# Patient Record
Sex: MALE | Race: WHITE | Employment: PART TIME | URBAN - METROPOLITAN AREA
[De-identification: names, ages, dates, MRNs, and addresses within clinical notes are randomized per-mention and may not be internally consistent; named-entity substitution may affect disease eponyms.]

---

## 2017-05-13 ENCOUNTER — HOSPITAL ENCOUNTER (OUTPATIENT)
Age: 47
Discharge: HOME | End: 2017-05-13
Attending: ORTHOPAEDIC SURGERY
Payer: COMMERCIAL

## 2017-05-13 DIAGNOSIS — M13.162 MONOARTHRITIS OF KNEE, LEFT: ICD-10-CM

## 2017-05-13 PROCEDURE — 73721 MRI JNT OF LWR EXTRE W/O DYE: CPT

## 2018-01-10 ENCOUNTER — OFFICE VISIT (OUTPATIENT)
Dept: SURGERY | Age: 48
End: 2018-01-10

## 2018-01-10 VITALS
RESPIRATION RATE: 20 BRPM | SYSTOLIC BLOOD PRESSURE: 112 MMHG | DIASTOLIC BLOOD PRESSURE: 70 MMHG | HEIGHT: 72 IN | WEIGHT: 315 LBS | BODY MASS INDEX: 42.66 KG/M2 | TEMPERATURE: 98.6 F | HEART RATE: 56 BPM

## 2018-01-10 DIAGNOSIS — G47.33 OSA ON CPAP: ICD-10-CM

## 2018-01-10 DIAGNOSIS — I10 ESSENTIAL HYPERTENSION: ICD-10-CM

## 2018-01-10 DIAGNOSIS — E66.01 MORBID OBESITY WITH BMI OF 45.0-49.9, ADULT (HCC): Primary | ICD-10-CM

## 2018-01-10 DIAGNOSIS — Z99.89 OSA ON CPAP: ICD-10-CM

## 2018-01-10 DIAGNOSIS — E78.2 MIXED HYPERLIPIDEMIA: ICD-10-CM

## 2018-01-10 RX ORDER — CHOLECALCIFEROL (VITAMIN D3) 125 MCG
2 CAPSULE ORAL DAILY
COMMUNITY
End: 2019-04-29

## 2018-01-10 RX ORDER — LISINOPRIL 10 MG/1
TABLET ORAL
Refills: 0 | COMMUNITY
Start: 2017-10-16 | End: 2020-10-23 | Stop reason: SDUPTHER

## 2018-01-10 RX ORDER — SIMVASTATIN 20 MG/1
TABLET, FILM COATED ORAL
Refills: 0 | COMMUNITY
Start: 2017-11-21 | End: 2022-04-28

## 2018-01-10 NOTE — LETTER
1/10/2018 9:47 AM 
 
Patient:  Emmett La III YOB: 1970 Date of Visit: 1/10/2018 Rocio Bowers MD 
[address] [facility] [address] [facility] [address] VIA Facsimile: [phone] Dear Rocio Bowers MD, Thank you for referring Mr. Radha Stevens to [place] [address] for evaluation and treatment. Below are the relevant portions of my assessment and plan of care. Initial Consultation for Bariatric Surgery Template (Gastric Bypass) Emmett La III is a 52 y.o. male who comes into the office today for initial consultation for the surgical options for the treatment of morbid obesity. The patient initially identified obesity at the age of 34 and at age 25 weighed 180lbs. He has tried a variety of unsupervised weight-loss attempts including self imposed, but has yet to meet with lasting success. Maximum weight lost on a diet is about 5 lbs, but that the weight loss always seems to return. Today, the patient is  Height: 6' (182.9 cm) tall, Weight: 153.8 kg (339 lb) lbs for a Body mass index is 45.98 kg/(m^2). It is due to the patient's severe obesity, which is further complicated by hypertension, hyperlipidemia, obstructive sleep apnea - CPAP and weight related arthopathies  that the patient is now seeking out bariatric surgery, specifically, the gastric bypass. Past Medical History:  
Diagnosis Date  Hypercholesterolemia  Hypertension  Sleep apnea   
 cpap Past Surgical History:  
Procedure Laterality Date  HX KNEE ARTHROSCOPY Left   
 torn meniscus  HX OTHER SURGICAL    
 wisdom teeth and implanted tooth Current Outpatient Prescriptions Medication Sig Dispense Refill  lisinopril (PRINIVIL, ZESTRIL) 10 mg tablet take 1 tablet by mouth once daily  0  
 simvastatin (ZOCOR) 20 mg tablet   0  
 naproxen sodium (ALEVE) 220 mg cap Take 2 Tabs by mouth daily. No Known Allergies Social History Substance Use Topics  Smoking status: Former Smoker Quit date: 1/10/2010  Smokeless tobacco: Never Used  Alcohol use Yes Family History Problem Relation Age of Onset  Kidney Disease Mother  Heart Disease Mother  Diabetes Mother  No Known Problems Father Family Status Relation Status  Mother Alive  Father Alive Review of Systems:  Positive in BOLD 
 
CONST: Fever, weight loss, fatigue or chills GI: Nausea, vomiting, abdominal pain, change in bowel habits, hematochezia, melena, and GERD INTEG: Dermatitis, abnormal moles HEENT: Recent changes in vision, vertigo, epistaxis, dysphagia and hoarseness CV: Chest pain, palpitations, HTN, edema and varicosities RESP: Cough, shortness of breath, wheezing, hemoptysis, snoring and reactive airway disease : Hematuria, dysuria, frequency, urgency, nocturia and stress urinary incontinence MS: Weakness, joint pain and arthritis ENDO: Diabetes, thyroid disease, polyuria, polydipsia, polyphagia, poor wound healing, heat intolerance, cold intolerance LYMPH/HEME: Anemia, bruising and history of blood transfusions NEURO: Dizziness, headache, fainting, seizures and stroke PSYCH: Anxiety and depression Physical Exam 
 
Visit Vitals  /70  Pulse (!) 56  Temp 98.6 °F (37 °C)  Resp 20  
 Ht 6' (1.829 m)  Wt 153.8 kg (339 lb)  BMI 45.98 kg/m2 Pre op weight: 339 EBW: 175 Wt loss to date: 0 General: 52 y.o.) male in no acute distress. Morbidly obese in abdomen - android pattern HEENT: Normocephalic, atraumatic, Pupils equal and reactive, nasopharynx clear, oropharynx clear and moist without lesions NECK: Supple, no lymphadenopathy, thyromegaly, carotid bruits or jugular venous distension. trachea midline RESP: Clear to auscultation bilaterally, no wheezes, rhonchi, or rales, normal respiratory excursion CV: Regular rate and rhythm, no murmurs, rubs or gallops.  3+/4 pulses in bilateral dorsalis pedis and posterior tibialis. No distal edema or varicosities. ABD: Soft, nontender, nondistended, normoactive bowel sounds, no hernias, no hepatosplenomegaly, easily palpable costal margins, android distribution Extremities: Warm, well perfused, no tenderness or swelling, normal gait/station Neuro: Sensation and strength grossly intact and symmetrical 
Psych: Alert and oriented to person, place, and time. Impression: 
 
Bibiana Kyle is a 52 y.o. male who is suffering from morbid obesity with a BMI of 46  and comorbidities including hypertension, hyperlipidemia, obstructive sleep apnea - CPAP and weight related arthopathies  who would benefit from bariatric surgery. We have had an extensive discussion with regard to the risks, benefits and likely outcomes of the operation. We've discussed the restrictive and malabsorptive nature of the gastric bypass and compared and contrasted with the sleeve gastrectomy. The patient understands the likelihood of losing approximately 80% of their excess weight in 12 to 18 months. He also understands the risks including but not limited to bleeding, infection, need for reoperation, ulcers, leaks and strictures, bowel obstruction secondary to adhesions and internal hernias, DVT, PE, heart attack, stroke, and death. Patient also understands risks of inadequate weight loss, excess weight loss, vitamin insufficiency, protein malnutrition, excess skin, and loss of hair. We have reviewed the components of a successful postoperative course including requirement for a high protein, low carbohydrate diet, 60 oz a day of zero calorie liquids, daily vitamin supplementation, daily exercise, regular follow-up, and participation in support groups.  At this time we will enroll the patient in our bariatric program, undertake routine laboratory evaluation, chest X-ray, EKG, possible UGI and evaluation by  nutritionist as well as psychologist and pending their satisfactory completion of the preop evaluation, plan to perform a gastric bypass. He is a [facility] patient. We will perform testing at his residential point. Thank you very much for your referral of Mr. Miguel Baugh. If you have questions, please do not hesitate to call me. I look forward to following Mr. Maren Hoang along with you and will keep you updated as to his progress.   
 
 
 
 
Sincerely, 
 
 
Waqas Jacobson MD

## 2018-01-10 NOTE — COMMUNICATION BODY
Initial Consultation for Bariatric Surgery Template (Gastric Bypass)    Zander Jones III is a 52 y.o. male who comes into the office today for initial consultation for the surgical options for the treatment of morbid obesity. The patient initially identified obesity at the age of 34 and at age 25 weighed 180lbs. He has tried a variety of unsupervised weight-loss attempts including self imposed, but has yet to meet with lasting success. Maximum weight lost on a diet is about 5 lbs, but that the weight loss always seems to return. Today, the patient is  Height: 6' (182.9 cm) tall, Weight: 153.8 kg (339 lb) lbs for a Body mass index is 45.98 kg/(m^2). It is due to the patient's severe obesity, which is further complicated by hypertension, hyperlipidemia, obstructive sleep apnea - CPAP and weight related arthopathies  that the patient is now seeking out bariatric surgery, specifically, the gastric bypass. Past Medical History:   Diagnosis Date    Hypercholesterolemia     Hypertension     Sleep apnea     cpap       Past Surgical History:   Procedure Laterality Date    HX KNEE ARTHROSCOPY Left     torn meniscus    HX OTHER SURGICAL      wisdom teeth and implanted tooth       Current Outpatient Prescriptions   Medication Sig Dispense Refill    lisinopril (PRINIVIL, ZESTRIL) 10 mg tablet take 1 tablet by mouth once daily  0    simvastatin (ZOCOR) 20 mg tablet   0    naproxen sodium (ALEVE) 220 mg cap Take 2 Tabs by mouth daily.          No Known Allergies    Social History   Substance Use Topics    Smoking status: Former Smoker     Quit date: 1/10/2010    Smokeless tobacco: Never Used    Alcohol use Yes       Family History   Problem Relation Age of Onset    Kidney Disease Mother     Heart Disease Mother     Diabetes Mother     No Known Problems Father        Family Status   Relation Status    Mother Alive    Father Alive       Review of Systems:  Positive in BOLD    CONST: Fever, weight loss, fatigue or chills  GI: Nausea, vomiting, abdominal pain, change in bowel habits, hematochezia, melena, and GERD   INTEG: Dermatitis, abnormal moles  HEENT: Recent changes in vision, vertigo, epistaxis, dysphagia and hoarseness  CV: Chest pain, palpitations, HTN, edema and varicosities  RESP: Cough, shortness of breath, wheezing, hemoptysis, snoring and reactive airway disease  : Hematuria, dysuria, frequency, urgency, nocturia and stress urinary incontinence   MS: Weakness, joint pain and arthritis  ENDO: Diabetes, thyroid disease, polyuria, polydipsia, polyphagia, poor wound healing, heat intolerance, cold intolerance  LYMPH/HEME: Anemia, bruising and history of blood transfusions  NEURO: Dizziness, headache, fainting, seizures and stroke  PSYCH: Anxiety and depression      Physical Exam    Visit Vitals    /70    Pulse (!) 56    Temp 98.6 °F (37 °C)    Resp 20    Ht 6' (1.829 m)    Wt 153.8 kg (339 lb)    BMI 45.98 kg/m2       Pre op weight: 339  EBW: 175  Wt loss to date: 0       General: 52 y.o.) male in no acute distress. Morbidly obese in abdomen - android pattern  HEENT: Normocephalic, atraumatic, Pupils equal and reactive, nasopharynx clear, oropharynx clear and moist without lesions  NECK: Supple, no lymphadenopathy, thyromegaly, carotid bruits or jugular venous distension. trachea midline  RESP: Clear to auscultation bilaterally, no wheezes, rhonchi, or rales, normal respiratory excursion  CV: Regular rate and rhythm, no murmurs, rubs or gallops. 3+/4 pulses in bilateral dorsalis pedis and posterior tibialis. No distal edema or varicosities.   ABD: Soft, nontender, nondistended, normoactive bowel sounds, no hernias, no hepatosplenomegaly, easily palpable costal margins, android distribution  Extremities: Warm, well perfused, no tenderness or swelling, normal gait/station  Neuro: Sensation and strength grossly intact and symmetrical  Psych: Alert and oriented to person, place, and time.    Impression:    Bibiana Kyle is a 52 y.o. male who is suffering from morbid obesity with a BMI of 46  and comorbidities including hypertension, hyperlipidemia, obstructive sleep apnea - CPAP and weight related arthopathies  who would benefit from bariatric surgery. We have had an extensive discussion with regard to the risks, benefits and likely outcomes of the operation. We've discussed the restrictive and malabsorptive nature of the gastric bypass and compared and contrasted with the sleeve gastrectomy. The patient understands the likelihood of losing approximately 80% of their excess weight in 12 to 18 months. He also understands the risks including but not limited to bleeding, infection, need for reoperation, ulcers, leaks and strictures, bowel obstruction secondary to adhesions and internal hernias, DVT, PE, heart attack, stroke, and death. Patient also understands risks of inadequate weight loss, excess weight loss, vitamin insufficiency, protein malnutrition, excess skin, and loss of hair. We have reviewed the components of a successful postoperative course including requirement for a high protein, low carbohydrate diet, 60 oz a day of zero calorie liquids, daily vitamin supplementation, daily exercise, regular follow-up, and participation in support groups. At this time we will enroll the patient in our bariatric program, undertake routine laboratory evaluation, chest X-ray, EKG, possible UGI and evaluation by  nutritionist as well as psychologist and pending their satisfactory completion of the preop evaluation, plan to perform a gastric bypass. He is a [facility] patient. We will perform testing at his detention point.

## 2018-01-10 NOTE — PROGRESS NOTES
Initial Consultation for Bariatric Surgery Template (Gastric Bypass)    Colin Foote III is a 52 y.o. male who comes into the office today for initial consultation for the surgical options for the treatment of morbid obesity. The patient initially identified obesity at the age of 34 and at age 25 weighed 180lbs. He has tried a variety of unsupervised weight-loss attempts including self imposed, but has yet to meet with lasting success. Maximum weight lost on a diet is about 5 lbs, but that the weight loss always seems to return. Today, the patient is  Height: 6' (182.9 cm) tall, Weight: 153.8 kg (339 lb) lbs for a Body mass index is 45.98 kg/(m^2). It is due to the patient's severe obesity, which is further complicated by hypertension, hyperlipidemia, obstructive sleep apnea - CPAP and weight related arthopathies  that the patient is now seeking out bariatric surgery, specifically, the gastric bypass. Past Medical History:   Diagnosis Date    Hypercholesterolemia     Hypertension     Sleep apnea     cpap       Past Surgical History:   Procedure Laterality Date    HX KNEE ARTHROSCOPY Left     torn meniscus    HX OTHER SURGICAL      wisdom teeth and implanted tooth       Current Outpatient Prescriptions   Medication Sig Dispense Refill    lisinopril (PRINIVIL, ZESTRIL) 10 mg tablet take 1 tablet by mouth once daily  0    simvastatin (ZOCOR) 20 mg tablet   0    naproxen sodium (ALEVE) 220 mg cap Take 2 Tabs by mouth daily.          No Known Allergies    Social History   Substance Use Topics    Smoking status: Former Smoker     Quit date: 1/10/2010    Smokeless tobacco: Never Used    Alcohol use Yes       Family History   Problem Relation Age of Onset    Kidney Disease Mother     Heart Disease Mother     Diabetes Mother     No Known Problems Father        Family Status   Relation Status    Mother Alive    Father Alive       Review of Systems:  Positive in BOLD    CONST: Fever, weight loss, fatigue or chills  GI: Nausea, vomiting, abdominal pain, change in bowel habits, hematochezia, melena, and GERD   INTEG: Dermatitis, abnormal moles  HEENT: Recent changes in vision, vertigo, epistaxis, dysphagia and hoarseness  CV: Chest pain, palpitations, HTN, edema and varicosities  RESP: Cough, shortness of breath, wheezing, hemoptysis, snoring and reactive airway disease  : Hematuria, dysuria, frequency, urgency, nocturia and stress urinary incontinence   MS: Weakness, joint pain and arthritis  ENDO: Diabetes, thyroid disease, polyuria, polydipsia, polyphagia, poor wound healing, heat intolerance, cold intolerance  LYMPH/HEME: Anemia, bruising and history of blood transfusions  NEURO: Dizziness, headache, fainting, seizures and stroke  PSYCH: Anxiety and depression      Physical Exam    Visit Vitals    /70    Pulse (!) 56    Temp 98.6 °F (37 °C)    Resp 20    Ht 6' (1.829 m)    Wt 153.8 kg (339 lb)    BMI 45.98 kg/m2       Pre op weight: 339  EBW: 175  Wt loss to date: 0       General: 52 y.o.) male in no acute distress. Morbidly obese in abdomen - android pattern  HEENT: Normocephalic, atraumatic, Pupils equal and reactive, nasopharynx clear, oropharynx clear and moist without lesions  NECK: Supple, no lymphadenopathy, thyromegaly, carotid bruits or jugular venous distension. trachea midline  RESP: Clear to auscultation bilaterally, no wheezes, rhonchi, or rales, normal respiratory excursion  CV: Regular rate and rhythm, no murmurs, rubs or gallops. 3+/4 pulses in bilateral dorsalis pedis and posterior tibialis. No distal edema or varicosities.   ABD: Soft, nontender, nondistended, normoactive bowel sounds, no hernias, no hepatosplenomegaly, easily palpable costal margins, android distribution  Extremities: Warm, well perfused, no tenderness or swelling, normal gait/station  Neuro: Sensation and strength grossly intact and symmetrical  Psych: Alert and oriented to person, place, and time.    Impression:    Dmitry Olvera is a 52 y.o. male who is suffering from morbid obesity with a BMI of 46  and comorbidities including hypertension, hyperlipidemia, obstructive sleep apnea - CPAP and weight related arthopathies  who would benefit from bariatric surgery. We have had an extensive discussion with regard to the risks, benefits and likely outcomes of the operation. We've discussed the restrictive and malabsorptive nature of the gastric bypass and compared and contrasted with the sleeve gastrectomy. The patient understands the likelihood of losing approximately 80% of their excess weight in 12 to 18 months. He also understands the risks including but not limited to bleeding, infection, need for reoperation, ulcers, leaks and strictures, bowel obstruction secondary to adhesions and internal hernias, DVT, PE, heart attack, stroke, and death. Patient also understands risks of inadequate weight loss, excess weight loss, vitamin insufficiency, protein malnutrition, excess skin, and loss of hair. We have reviewed the components of a successful postoperative course including requirement for a high protein, low carbohydrate diet, 60 oz a day of zero calorie liquids, daily vitamin supplementation, daily exercise, regular follow-up, and participation in support groups. At this time we will enroll the patient in our bariatric program, undertake routine laboratory evaluation, chest X-ray, EKG, possible UGI and evaluation by  nutritionist as well as psychologist and pending their satisfactory completion of the preop evaluation, plan to perform a gastric bypass. He is a [facility] patient. We will perform testing at his longterm point.

## 2018-01-10 NOTE — PROGRESS NOTES
Pt ID confirmed    Weight Loss Metrics 1/10/2018 1/10/2018   Pre op / Initial Wt 339 -   Today's Wt - 339 lb   BMI - 45.98 kg/m2   Ideal Body Wt 164 -   Excess Body Wt 175 -   Goal Wt 199 -   Wt loss to date 0 -   % Wt Loss 0 -   80%  -       Body mass index is 45.98 kg/(m^2).

## 2018-01-12 LAB
UREA BREATH TEST QL: NEGATIVE
UREA BREATH TEST QL: NORMAL

## 2018-02-06 ENCOUNTER — HOSPITAL ENCOUNTER (OUTPATIENT)
Dept: BARIATRICS/WEIGHT MGMT | Age: 48
Discharge: HOME | End: 2018-02-06

## 2018-02-07 ENCOUNTER — DOCUMENTATION ONLY (OUTPATIENT)
Dept: BARIATRICS/WEIGHT MGMT | Age: 48
End: 2018-02-07

## 2018-02-07 NOTE — PROGRESS NOTES
[address] [place] [facility] [address] [facility], [address]    Patient's Name: aMlena Dhaliwal   Age: 52 y.o. YOB: 1970   Sex: male    Date:   2/7/2018    Insurance:            Session: 1 of 12  Revision:   Surgeon:  Dr. Urszula Jean    Height: 6 f  Weight:    339      Lbs. BMI: 46.1   Pounds Lost since last month: 0               Pounds Gained since last month: 0    Starting Weight: 339   Previous Months Weight: 339  Overall Pounds Lost: 0 Overall Pounds Gained: 0      Do you smoke? None    Alcohol intake:  Number of drinks at a time:  4-5  Number of times a week: 2-3    Class Guidelines    Guidelines are reviewed with patient at the start of every class. 1. Patient understands that weight loss trial classes must be consecutive. Patient understands if they miss a class, it is their responsibility to contact me to reschedule class. I will reach out to patient after their first no show. 2.  Patient understands the expectations that weight maintenance/weight loss is expected during the classes. Failure to demonstrate changes may result in one extra month of weight loss trial, followed by going back to see the surgeon. 3. Patient is also instructed to be doing their labs, blood work, psych visit, support group and any other test that the surgeon has used while they are working on their weight loss trial.    Other Pertinent Information:     Changes Made Since Last Class:     Eating Habits and Behaviors    In today's class, we talked about the key diet principles. We talked a lot carbohydrates and the effects of carbohydrates on the body. Patient was encouraged to keep their daily carbohydrate intake less than 100 grams per day. Patient was encouraged to start cutting out the starchy foods, such as bread, rice, and pasta.   Patient is encouraged to get their carbohydrates in the form of dairy, vegetables, bean, some fruit, but starting to get rid of the starchy vegetables. We also talked about snacks that are more protein-based. Patient was encouraged to stay away from crackers, cookies, candy, etc.  We also talked about avoiding liquid calories and focusing more on water, Crystal Light, or other drinks that have no carbonation, no caffeine, no sugar. I also gave a power point of 13 ways to eat healthy on a tight budget. Some of these things included planning meals, sticking to a grocery list, and cooking at home. Patient was encouraged to buy whole foods, which is often cheaper than buying packaged foods. Also, stop buying junk food, which adds up in cost.    Patient's current diet habits include: Patient's diet is heavy in carbohydrates. He is eating toast for breakfast.  Lunch is often a sandwich. Dinner varies between meat, starch. He is snacking on chips and Nabs. He is eating out 1-2 x a month. He is drinking 80 ounces of fluid per day. Physical Activity/Exercise    Comments:     Currently for exercise, patient is not doing anything. We talked about activities for patient to do, including walking, swimming, or chair exercises. Behavior Modification       Comments: We also talked about behavior modifications. I have encouraged patient to food journal and write down what they are eating. Patient was again encouraged to keep their daily carbohydrates less than 100 grams per day. We talked about not eating in front of the TV and the importance of planning meals ahead of time.     Goals that patient wants to work on for next month include:      Chi Cobos. Roxana Delaney 112  2/7/2018

## 2018-02-22 ENCOUNTER — TELEPHONE (OUTPATIENT)
Dept: SURGERY | Age: 48
End: 2018-02-22

## 2018-03-06 ENCOUNTER — TELEPHONE (OUTPATIENT)
Dept: SURGERY | Age: 48
End: 2018-03-06

## 2018-03-06 ENCOUNTER — HOSPITAL ENCOUNTER (OUTPATIENT)
Dept: BARIATRICS/WEIGHT MGMT | Age: 48
Discharge: HOME | End: 2018-03-06

## 2018-03-07 ENCOUNTER — DOCUMENTATION ONLY (OUTPATIENT)
Dept: BARIATRICS/WEIGHT MGMT | Age: 48
End: 2018-03-07

## 2018-03-07 NOTE — PROGRESS NOTES
[address] [place] [address] [facility], [address]    Patient's Name: Miguel Baugh   Age: 52 y.o. YOB: 1970   Sex: male    Date:   3/7/2018    Insurance:            Session: 2 of 12  Revision:   Surgeon:  Dr. Lila Patel    Height: 6 f  Weight:    341      Lbs. BMI: 46.3   Pounds Lost since last month: 0               Pounds Gained since last month: 2    Starting Weight: 339   Previous Months Weight: 339  Overall Pounds Lost: 0 Overall Pounds Gained: 2      Do you smoke? None    Alcohol intake:  Number of drinks at a time:  2  Number of times a week: 1    Class Guidelines    Guidelines are reviewed with patient at the start of every class. 1. Patient understands that weight loss trial classes must be consecutive. Patient understands if they miss a class, it is their responsibility to contact me to reschedule class. I will reach out to patient after their first no show. 2.  Patient understands the expectations that weight maintenance/weight loss is expected during the classes. Failure to demonstrate changes may result in one extra month of weight loss trial, followed by going back to see the surgeon. 3. Patient is also instructed to be doing their labs, blood work, psych visit, support group and any other test that the surgeon has used while they are working on their weight loss trial.    Other Pertinent Information:     Changes Made Since Last Class: States he is trying to watch what he eats. Eating Habits and Behaviors      We started off class today by reviewing key diet principles. We talked about the importance of hydration and aiming for 64 ounces of fluid and not drinking liquid calories. We also talked starting to cut carbohydrates out and aiming for less than 100 grams of carbohydrates per day. Patient was encouraged to stick to meat and vegetables and limit the starches. We talked about more protein-based snacks. The lesson also reviewed Healthy Eating on the Run tips. This included planning ahead when they go to a restaurant, placing special requests, leaving the bun off of sandwiches, and avoiding all-you-can-eat specials or buffets. Patient was given ideas of foods choices that would be healthier when eating out. Patient's current diet habits include: Patient is eating 3 meals per day. He states he is eating toast for breakfast. Lunch is often a sandwich while at work. [place] is whatever is cooked. He states he is snacking on chips. He states he does not eat out. He is drinking 100 ounces of water per day. Physical Activity/Exercise    Comments:     Currently for exercise, patient is walking. We talked about activities for patient to do, including walking, swimming, or chair exercises. Behavior Modification       Comments:   During class, we also talked about other behaviors. One of these behaviors was to start food journaling. Patient was encouraged to write down what they are eating. If they are not willing to do that, they are encouraged to write down the \"bad food,\" i.e. Junk food, liquid calories, packaged food. Hopefully at the end of the day, they will be able to find areas that calories and carbohydrates could have been cut. We also talked about the importance of 3 meals a day. Skipping meals could lead to larger portions, grazing, and one's metabolism to decrease causing 6-10 pounds of weight gain per year. Patient is eating 3 meals per day. I have recommended patient to focus more on protein at meals and start cutting back on his carbohydrate intake.       Briseyda Cook Mane [address] RD  3/7/2018

## 2018-03-20 ENCOUNTER — TELEPHONE (OUTPATIENT)
Dept: SURGERY | Age: 48
End: 2018-03-20

## 2018-03-20 ENCOUNTER — DOCUMENTATION ONLY (OUTPATIENT)
Dept: SURGERY | Age: 48
End: 2018-03-20

## 2018-03-20 NOTE — TELEPHONE ENCOUNTER
lvm for patient to call me he needs to be in my active health and they will be calling him will let Cassidy Mchugh know so that he will be seen every 3 months.

## 2018-03-20 NOTE — PROGRESS NOTES
Called and enrolled patient into my active health case # [id number] spoke with Brandon Lugo and nurse Mahendra Smiley.

## 2018-04-03 ENCOUNTER — DOCUMENTATION ONLY (OUTPATIENT)
Dept: BARIATRICS/WEIGHT MGMT | Age: 48
End: 2018-04-03

## 2018-04-03 ENCOUNTER — HOSPITAL ENCOUNTER (OUTPATIENT)
Dept: BARIATRICS/WEIGHT MGMT | Age: 48
Discharge: HOME | End: 2018-04-03

## 2018-04-03 NOTE — PROGRESS NOTES
[address] [place] [address] [facility], [address]    Patient's Name: Loan Sanchez   Age: 52 y.o. YOB: 1970   Sex: male     Height: 6 f  Beginning Weight: 339  Last Month: 341  Current Weight: 340    Date:   4/3/2018    Insurance:            Session: 3 of  12  Patient will be seeing me quarterly now. He is working with [employer] . Rules of class are reviewed with patient at the start of every class. 1. Patient understands that weight loss trial classes must be consecutive. Patient understands if they miss a class, it is their responsibility to contact me to reschedule class. I will reach out to patient after their first no show. 2.  Patient understands the expectations that weight maintenance/weight loss is expected during the classes. Failure to demonstrate changes may result in one extra month of weight loss trial, followed by going back to see the surgeon. 3. Patient is also instructed to be doing their labs, blood work, psych visit, support group and any other test that the surgeon has used while they are working on their weight loss trial.    Other Pertinent Information:     Changes Made Since Last Class: Patient states he has started drinking a Premier shake for a meal while at work. Patient states he is doing those for breakfast and lunch. Eating Habits and Behaviors      We started off class today by reviewing key diet principles. Patient was given a very specific list of foods that they can eat, which included meat, fish, vegetables, eggs, cheese, fats, soy, and berries. Patient was also given a list of foods that should be avoided. These included sweets (candy, soda, baked goods, ice cream), and starches, including pasta, rice, crackers, chips, oatmeal, bread. We talked about appropriate protein-based snacks, including deli meat, low fat cheese, yogurt, hummus, small handful of almonds.     I also gave a power point on ways to help with the metabolism. Some of the food-related ways included: Healthy snacking, eating adequate protein, and getting adequate water. Mild dehydration may slow down one's weight loss. Patient's current diet habits include: Patient states he is trying to cut his portions. He states he does eat a lot of bread and is finding that to be difficult. Patient states at meals he is trying to focus on protein or chicken. Patient states he is trying to replace his meals with a protein shake. He is using Premier. Patient states he is trying to cut his snacking. He states he has the snacking under control at work, it's at home, he struggles with. At home, he is snacking on chips. He states he is not a big sweet eater. He states he is drinking over 60 ounces of water per day. No liquid calories. Physical Activity/Exercise    Comments:     Currently for exercise, patient is walking at work. We talked about activities for patient to do, including walking, swimming, or chair exercises. I also talked with patient about doing some strength training, which helps the metabolism, as well. Behavior Modification       Comments: In the power point, Mastering Your Metabolism, I also gave behaviors that can help with one's metabolism. These include not skipping meals and being sure to feed and fuel that body rather than going large gaps and putting the body in starvation mode. One goal for next month includes:  1. Cut back on portions.   2. Cut out the bread      Briseyda Vaughan Mane [address] RD  4/3/2018

## 2018-06-13 ENCOUNTER — OFFICE VISIT (OUTPATIENT)
Dept: SURGERY | Age: 48
End: 2018-06-13

## 2018-06-13 VITALS
WEIGHT: 315 LBS | TEMPERATURE: 98.4 F | DIASTOLIC BLOOD PRESSURE: 70 MMHG | HEART RATE: 69 BPM | HEIGHT: 72 IN | SYSTOLIC BLOOD PRESSURE: 120 MMHG | BODY MASS INDEX: 42.66 KG/M2

## 2018-06-13 DIAGNOSIS — G47.33 OSA ON CPAP: ICD-10-CM

## 2018-06-13 DIAGNOSIS — E66.01 MORBID OBESITY WITH BMI OF 45.0-49.9, ADULT (HCC): Primary | ICD-10-CM

## 2018-06-13 DIAGNOSIS — I10 ESSENTIAL HYPERTENSION: ICD-10-CM

## 2018-06-13 DIAGNOSIS — Z01.818 PREOP EXAMINATION: ICD-10-CM

## 2018-06-13 DIAGNOSIS — Z99.89 OSA ON CPAP: ICD-10-CM

## 2018-06-13 NOTE — PROGRESS NOTES
Pt ID confirmed    Weight Loss Metrics 6/13/2018 6/13/2018 1/10/2018 1/10/2018   Pre op / Initial Wt 355 - 339 -   Today's Wt - 355 lb - 339 lb   BMI - 48.15 kg/m2 - 45.98 kg/m2   Ideal Body Wt 164 - 164 -   Excess Body Wt 191 - 175 -   Goal Wt 202 - 199 -   Wt loss to date 0 - 0 -   % Wt Loss 0 - 0 -   80% .8 - 140 -       Body mass index is 48.15 kg/(m^2).

## 2018-06-13 NOTE — PROGRESS NOTES
Bariatric Preoperative Progress note:    Subjective:     Ruby Funes is a 52 y.o. male who presents today for followup of their candidacy for bariatric surgery. Since last seen, Ruby Funes has been working through bariatric program towards LGBP. He hasa 12 mo WLT. Continues to use CPAP. 4lbs down. Past Medical History:   Diagnosis Date    Hypercholesterolemia     Hypertension     Sleep apnea     cpap       Past Surgical History:   Procedure Laterality Date    HX KNEE ARTHROSCOPY Left     torn meniscus    HX OTHER SURGICAL      wisdom teeth and implanted tooth       Current Outpatient Prescriptions   Medication Sig Dispense Refill    lisinopril (PRINIVIL, ZESTRIL) 10 mg tablet take 1 tablet by mouth once daily  0    simvastatin (ZOCOR) 20 mg tablet   0    naproxen sodium (ALEVE) 220 mg cap Take 2 Tabs by mouth daily. No Known Allergies    ROS:  Review of Systems   Constitutional: Negative. HENT: Negative. Eyes: Negative. Respiratory: Negative. Cardiovascular: Negative. Gastrointestinal: Negative. Genitourinary: Negative. Musculoskeletal: Positive for joint pain. Neurological: Negative. Objective:     Physical Exam:  Visit Vitals    /70    Pulse 69    Temp 98.4 °F (36.9 °C)    Ht 6' (1.829 m)    Wt (!) 161 kg (355 lb)    BMI 48.15 kg/m2       Physical Exam   Constitutional: He is oriented to person, place, and time and well-developed, well-nourished, and in no distress. HENT:   Head: Normocephalic. Cardiovascular: Normal rate. Pulmonary/Chest: Effort normal.   Abdominal: Soft. Neurological: He is alert and oriented to person, place, and time. Skin: Skin is warm and dry.    Psychiatric: Affect normal.       Studies to date:    Labs: H. Pylori neg, other labs not done  EKG: not done     Nutritional evaluation: active health phone calls with [employer] q month and Penny Calle q3 month, next WLT with Penny Calle July     Psychiatric evaluation: approved Assessment:   Victorino Donahue is a 52 y.o. male who is progressing through the bariatric preoperative evaluation. At this time, they are an appropriate candidate for weight loss surgery. Plan:   -complete remainder of preop evaluation including WLT, [employer] RD phone calls, labs, EKG. -Understands he cannot gain wt in WLT. -Follow up once has completed entirety of weight loss workup to determine next steps.          >50% of 15 min visit spent counseling     Claudell Southern, FNP-BC

## 2018-07-11 ENCOUNTER — HOSPITAL ENCOUNTER (OUTPATIENT)
Dept: BARIATRICS/WEIGHT MGMT | Age: 48
Discharge: HOME | End: 2018-07-11

## 2018-07-11 ENCOUNTER — DOCUMENTATION ONLY (OUTPATIENT)
Dept: BARIATRICS/WEIGHT MGMT | Age: 48
End: 2018-07-11

## 2018-07-11 NOTE — PROGRESS NOTES
[address] [place] [address] [facility], [address]    Patient's Name: Cintia Fuentes   Age: 50 y.o. YOB: 1970   Sex: male    Date:   7/11/2018    Insurance:            Session: 4  (quarterly visits)  Revision:   Surgeon:  Dr. Tuyet Adams    Height: 6 f  Weight:    339      Lbs. BMI: 46.14   Pounds Lost since last month: 1               Pounds Gained since last month: 0    Starting Weight: 339   Previous Months Weight: 339  Overall Pounds Lost: 0 Overall Pounds Gained: 0      Do you smoke? None    Alcohol intake:  Number of drinks at a time:  Few beers a few times a week  Number of times a week:     Class Guidelines    Guidelines are reviewed with patient at the start of every class. 1. Patient understands that weight loss trial classes must be consecutive. Patient understands if they miss a class, it is their responsibility to contact me to reschedule class. I will reach out to patient after their first no show. 2.  Patient understands the expectations that weight maintenance/weight loss is expected during the classes. Failure to demonstrate changes may result in one extra month of weight loss trial, followed by going back to see the surgeon. Patient understands that they CAN NOT gain any weight during the weight loss trial.  Gaining weight will result in extra classes. 3. Patient is also instructed to be doing their labs, blood work, psych visit, support group and any other test that the surgeon has used while they are working on their weight loss trial.  4.  Patient was instructed to bring their blue binder to every class and appointment. Other Pertinent Information:     Changes Made Since Last Class: Cutting back on meals. Eating Habits and Behaviors    Today in class, we started talking about the key diet principles. We first focused on stopping liquid calories. Patient was also educated on carbohydrates.   Patient was instructed to start cutting out bread, rice, and pasta from the diet and start focusing more on meat and vegetables. I then gave a power point, which focused on Label Reading. In class, I gave patients a labels and we worked through a series of questions to help patients have a better understanding of label reading. Patient was instructed to review the serving size. Patient was encouraged to focus on protein and carbohydrates. We also did a few label reading activities to help the patient become more familiar with label reading. Patient's current diet habits include: 2 meals per day. Patient is doing a protein shake for breakfast.  He is doing turkey and cheese for lunch. Dinner varies. He states he may snack on Nabs. We talked about more protein-based foods. He is drinking over 100 ounces of fluid per day. Physical Activity/Exercise    Comments: We talked about exercise. Patient was given reasons of why exercise is so important and how that can help with their long-term success. I have encouraged patient to get a support system to help with the activity. Currently for activity, patient is walking all day. Behavior Modification       Comments:  Behavior modifications were reinforced. This included not eating in front of the TV, which could lead to bigger portions and eating when one is not hungry. We also talked about the importance of eating 3 meals per day. Patient was encouraged to food journal to keep their daily carbohydrates less than 30 grams per meal.      Goals that patient wants to work on includes:  1. Cut back on portions  2.  Walk more      Briseyda Yuan [address] RD  7/11/2018

## 2018-08-23 LAB — LDL-C, EXTERNAL: 108

## 2018-10-03 ENCOUNTER — HOSPITAL ENCOUNTER (OUTPATIENT)
Dept: BARIATRICS/WEIGHT MGMT | Age: 48
Discharge: HOME | End: 2018-10-03

## 2018-10-03 ENCOUNTER — DOCUMENTATION ONLY (OUTPATIENT)
Dept: BARIATRICS/WEIGHT MGMT | Age: 48
End: 2018-10-03

## 2018-10-03 NOTE — PROGRESS NOTES
[address] [place] [address] [facility], [address]    Patient's Name: Verline Cooks   Age: 50 y.o. YOB: 1970   Sex: male    Date:   10/3/2018    Insurance:            Session: 5 (quarterly visits)  Revision:   Surgeon:  Dr. Brayden Hoffman    Height: 6 f  Weight:    335      Lbs. BMI: 45.5   Pounds Lost since last month: 4               Pounds Gained since last month: 0    Starting Weight: 339   Previous Months Weight: 339  Overall Pounds Lost: 4 Overall Pounds Gained: 0      Do you smoke? None    Alcohol intake:  Number of drinks at a time:  2  Number of times a week: 2    Class Guidelines    Guidelines are reviewed with patient at the start of every class. 1. Patient understands that weight loss trial classes must be consecutive. Patient understands if they miss a class, it is their responsibility to contact me to reschedule class. I will reach out to patient after their first no show. 2.  Patient understands the expectations that weight maintenance/weight loss is expected during the classes. Failure to demonstrate changes may result in one extra month of weight loss trial, followed by going back to see the surgeon. 3. Patient is also instructed to be doing their labs, blood work, psych visit, support group and any other test that the surgeon has used while they are working on their weight loss trial.  4. Patient is instructed to bring their education binder to all classes. Other Pertinent Information:     Changes Made Since Last Class: Cutting back on carbohdyrates    Eating Habits and Behaviors      Today we reviewed key diet principles. We talked about patient following a low calorie/low carbohydrate diet while they are in weight loss trials. To achieve this, patient is encouraged to avoid liquid calories, including alcohol, soda, sweet tea, and fruit juices.    Patient can cut carbohydrates by trying to stick to meat and vegetables. Patient can also eat eggs, cheese, and good fat, while trying to eliminate starches, such as pasta, rice, crackers, chips, popcorn. I also gave a power point that included 21 Ways to Stay on Track with a Healthy Lifestyle. Some of the food-related suggestions included drinking plenty of water or calorie-free beverages prior to their meal.  Patient is encouraged to to fill up on protein first, which is the ultimate fill-me up food. We talked about the importance of eating breakfast and the effects that can happen if one skips meals, which includes eating larger portions, snacking more, and decreasing their metabolism. With the suggestions in the power point, patient will be able to decrease their calories and carbohydrate intake. Patient's current diet habits include: 2 meals and 1 shake per day. He states he is doing eggs for breakfast.  Lunch is a shake. Dinner is chicken and vegetables. Patient states he is snacking on Nabs. We talked about some lower carbohydrate snack choices. He states he does not eat out anymore. Physical Activity/Exercise    Comments: We talked about the importance of establishing a work out routine. Patient is currently doing walking at work for activity. I have encouraged for him to get in some activity above and beyond his normal routine. Behavior Modification       Comments:   Some of the behavior tips that were included in the power point, include being choosy about night time snacking.   Patient was encouraged to make the TV a no eating zone and not eat after 7 pm.  Patient is also encouraged to keep a food journal.      Briseyda Borjas Mane [address] RD  10/3/2018

## 2019-01-10 ENCOUNTER — DOCUMENTATION ONLY (OUTPATIENT)
Dept: BARIATRICS/WEIGHT MGMT | Age: 49
End: 2019-01-10

## 2019-01-10 ENCOUNTER — HOSPITAL ENCOUNTER (OUTPATIENT)
Dept: BARIATRICS/WEIGHT MGMT | Age: 49
Discharge: HOME | End: 2019-01-10

## 2019-01-10 NOTE — PROGRESS NOTES
[address] [place] [address] [facility], [address]    Patient's Name: Edilma Marie   Age: 50 y.o. YOB: 1970   Sex: male    Date:   1/10/2019    Insurance:             Session: 6 quarterly  Revision:     Surgeon:  Dr. Darnell Manzanares    Height: 6 f    Weight:    336      Lbs. BMI: 45.7   Pounds Lost since last month: 0                 Pounds Gained since last month: 1    Starting Weight: 339     Previous Months Weight: 335  Overall Pounds Lost: 3   Overall Pounds Gained: 0    Do you smoke? None    Alcohol intake:  Number of drinks at a time:  2-3  Number of times a week: weekends only    Class Guidelines    Guidelines are reviewed with patient at the start of every class. 1. Patient understands that weight loss trial classes must be consecutive. Patient understands if they miss a class, it is their responsibility to contact me to reschedule class. I will reach out to patient after their first no show. 2.  Patient understands the expectations that weight maintenance/weight loss is expected during the classes. Failure to demonstrate changes may result in one extra month of weight loss trial, followed by going back to see the surgeon. 3. Patient is also instructed to be doing their labs, blood work, psych visit, support group and any other test that the surgeon has used while they are working on their weight loss trial.    Other Pertinent Information:     Changes Made Since Last Class: Walking more and cutting back on meal portions. Eating Habits and Behaviors      Today we reviewed key diet principles. We talked about snack ideas that would focus more on protein. We also talked about the benefits of filling up on protein first and keeping the daily carbohydrate intake to less than 100 grams per day. Patient was instructed to increase fluid intake to 64 ounces per day and stop all carbonation, caffeine, and sugary drinks. During class, we talked about the importance of getting on a routine of eating 3 meals a day, eating within one hour of waking up, and not going longer than 4 hours without fueling the body again. I also talked with patient about some meal ideas. Patient's current diet habits include: 2 meals per day. Patient is eating eggs and sausage for breakfast.  Lunch is a protein shake. He states he will have some type of protein for dinner. He states he is not snacking between meals. He states he is not eating out. He is drinking over 100 ounces of water per day. Physical Activity/Exercise    Comments:     Currently for exercise, patient is doing little activity. We talked about activities for patient to do, including walking, swimming, or chair exercises. Goals have been set. Behavior Modification       Comments:   During class, I reviewed a power point with patients called, \"Assessing Your Readiness to Change. \"  During this power point, patient was asked to self-evaluate themselves. At the end, we tallied the scores to determine how ready they are to make changes for the surgery. For the New Year's, I had patient set New Year's resolutions, including a food-related goal, exercise-related goal, and behavior goal.  Patient was encouraged to track the goals on a daily basis using the check off list I provided. Goals should be SMART, specific, measurable, attainable, realistic, and time-orientated. Patient's Goals are:  1. Behavior-Related Goal: Stop eating after 7 pm.   2. Food-related goal: Cut back on carbohydrates  3. Exercise-related goal: Walk more.       Briseyda Vargas Mane [address] RD  1/10/2019

## 2019-01-15 LAB — PSA, EXTERNAL: 0.4

## 2019-03-14 ENCOUNTER — DOCUMENTATION ONLY (OUTPATIENT)
Dept: BARIATRICS/WEIGHT MGMT | Age: 49
End: 2019-03-14

## 2019-03-14 ENCOUNTER — HOSPITAL ENCOUNTER (OUTPATIENT)
Dept: BARIATRICS/WEIGHT MGMT | Age: 49
Discharge: HOME | End: 2019-03-14

## 2019-03-14 NOTE — PROGRESS NOTES
[address] [place] [address] [facility], [address]    Patient's Name: John Gilmore   Age: 50 y.o. YOB: 1970   Sex: male    Date:   3/14/2019    Insurance:            Session: 7   Revision:   Surgeon:  Dr. Saravia    Height: 6 f Weight:    334      Lbs. BMI: 45.4   Pounds Lost since last month: 2               Pounds Gained since last month: 0      Starting Weight: 339   Previous Months Weight: 336  Overall Pounds Lost: 5 Overall Pounds Gained: 0      Do you smoke? None    Alcohol intake:  Number of drinks at a time:  None  Number of times a week: None    Class Guidelines    Guidelines are reviewed with patient at the start of every class. 1. Patient understands that weight loss trial classes must be consecutive. Patient understands if they miss a class, it is their responsibility to contact me to reschedule class. I will reach out to patient after their first no show. 2.  Patient understands the expectations that weight maintenance/weight loss is expected during the classes. Failure to demonstrate changes may result in one extra month of weight loss trial, followed by going back to see the surgeon. Patient understands that they CAN NOT gain any weight during the weight loss trial.  Gaining weight will result in extra classes. 3. Patient is also instructed to be doing their labs, blood work, psych visit, support group and any other test that the surgeon has used while they are working on their weight loss trial.  4.  Patient was instructed to bring their blue binder to every class and appointment. Other Pertinent Information:     Changes Made Since Last Class: Try to walk more. Eating Habits and Behaviors    Today in class, we reviewed the key diet principles. I have talked to patient about pushing the fluid and working towards 64 ounces per day. Patient was given ideas of liquids that would be okay.   Patient was encouraged to cut out liquid calories, such as soda and sweet tea. We talked about the reasons that sugar sweetened beverages can promote weight gain. Sugar is highly palatable. Excessive consumption of sugar can trigger an exaggerated release of dopamine, which can promote a compulsive drive to consume more sugar sweetened beverages. Also, satiety is not reached with liquid calories the same way it does with solid calories. In class, we also talked about focusing on protein and low carbohydrates. Patient was encouraged during the weight loss trial to keep their carbohydrate less than 100 grams per day and their protein level at 60-80 grams per day. We talked about meal choices and snack ideas. Patient's current diet habits include: 2 melas per day. Eating eggs and sausage for breakfast.  Lunch is a protein shake. Dinner is chicken. He states he is drinking over 100 ounces of fluid per day. He is eating out 1-2 x a month. Physical Activity/Exercise    Comments: We talked about exercise. Patient was given reasons of why exercise is so important and how that can help with their long-term success. I have encouraged patient to get a support system to help with the activity. Currently for activity, patient is doing mostly walking. Behavior Modification       Comments:  During today's lesson, I also spent some time talking about behavior changes. I talked to patient about the importance of taking vitamins post op and we reviewed the vitamins that patients will be taking post op. Patient will hear this again at pre op class before surgery. Patient had the opportunity to ask questions about these vitamins that will be lifelong. Goals that patient wants to work on includes:  1. Continue to follow diet protocol. 2. Continue to walk more.       Briseyda Mckenzie Mane [address] RD  3/14/2019

## 2019-03-26 ENCOUNTER — TELEPHONE (OUTPATIENT)
Dept: SURGERY | Age: 49
End: 2019-03-26

## 2019-03-26 NOTE — TELEPHONE ENCOUNTER
LVM for patient to call. Patient will finish my active health on 3/27.  He still needs to get labs and pcp clearance

## 2019-04-08 ENCOUNTER — TELEPHONE (OUTPATIENT)
Dept: SURGERY | Age: 49
End: 2019-04-08

## 2019-04-08 NOTE — TELEPHONE ENCOUNTER
Pt called to inform clinic that he attempted to have labs drawn Saturday at [facility] and they turned him away saying his labs were d/c'd. I reviewed the labs and they are still active ordered by Salome Alberts NP 18 and  19. Pt reports that he unable to have labs drawn until Thursday, day of his appointment, or Saturday. I informed him that he does need to have his labs completed before his appointment with the provider, and suggested he speak with Joseph Mena regarding this matter. Pt verbalized understanding and he was transferred to her ext.

## 2019-04-11 ENCOUNTER — HOSPITAL ENCOUNTER (OUTPATIENT)
Dept: LAB | Age: 49
Discharge: HOME | End: 2019-04-11
Payer: COMMERCIAL

## 2019-04-11 ENCOUNTER — OFFICE VISIT (OUTPATIENT)
Dept: SURGERY | Age: 49
End: 2019-04-11

## 2019-04-11 VITALS
RESPIRATION RATE: 16 BRPM | WEIGHT: 315 LBS | OXYGEN SATURATION: 96 % | HEIGHT: 72 IN | TEMPERATURE: 97.8 F | BODY MASS INDEX: 42.66 KG/M2 | HEART RATE: 60 BPM

## 2019-04-11 DIAGNOSIS — I10 ESSENTIAL HYPERTENSION: ICD-10-CM

## 2019-04-11 DIAGNOSIS — Z01.818 PREOP EXAMINATION: ICD-10-CM

## 2019-04-11 DIAGNOSIS — E66.01 MORBID OBESITY WITH BMI OF 45.0-49.9, ADULT (HCC): Primary | ICD-10-CM

## 2019-04-11 DIAGNOSIS — G47.33 OSA ON CPAP: ICD-10-CM

## 2019-04-11 DIAGNOSIS — E78.2 MIXED HYPERLIPIDEMIA: ICD-10-CM

## 2019-04-11 DIAGNOSIS — Z99.89 OSA ON CPAP: ICD-10-CM

## 2019-04-11 LAB
25(OH)D3 SERPL-MCNC: 19.1 NG/ML (ref 30–100)
ALBUMIN SERPL-MCNC: 3.9 G/DL (ref 3.4–5)
ALBUMIN/GLOB SERPL: 1.2 {RATIO} (ref 0.8–1.7)
ALP SERPL-CCNC: 94 U/L (ref 45–117)
ALT SERPL-CCNC: 24 U/L (ref 16–61)
ANION GAP SERPL CALC-SCNC: 7 MMOL/L (ref 3–18)
AST SERPL-CCNC: 18 U/L (ref 15–37)
BASOPHILS # BLD: 0 K/UL (ref 0–0.1)
BASOPHILS NFR BLD: 0 % (ref 0–2)
BILIRUB SERPL-MCNC: 0.7 MG/DL (ref 0.2–1)
BUN SERPL-MCNC: 14 MG/DL (ref 7–18)
BUN/CREAT SERPL: 14 (ref 12–20)
CALCIUM SERPL-MCNC: 9 MG/DL (ref 8.5–10.1)
CHLORIDE SERPL-SCNC: 104 MMOL/L (ref 100–108)
CO2 SERPL-SCNC: 30 MMOL/L (ref 21–32)
CREAT SERPL-MCNC: 1 MG/DL (ref 0.6–1.3)
DIFFERENTIAL METHOD BLD: NORMAL
EOSINOPHIL # BLD: 0.3 K/UL (ref 0–0.4)
EOSINOPHIL NFR BLD: 4 % (ref 0–5)
ERYTHROCYTE [DISTWIDTH] IN BLOOD BY AUTOMATED COUNT: 12.7 % (ref 11.6–14.5)
FERRITIN SERPL-MCNC: 155 NG/ML (ref 8–388)
FOLATE SERPL-MCNC: 15.5 NG/ML (ref 3.1–17.5)
GLOBULIN SER CALC-MCNC: 3.2 G/DL (ref 2–4)
GLUCOSE SERPL-MCNC: 106 MG/DL (ref 74–99)
HCT VFR BLD AUTO: 45.8 % (ref 36–48)
HGB BLD-MCNC: 15.7 G/DL (ref 13–16)
IRON SERPL-MCNC: 58 UG/DL (ref 50–175)
LYMPHOCYTES # BLD: 2.1 K/UL (ref 0.9–3.6)
LYMPHOCYTES NFR BLD: 27 % (ref 21–52)
MCH RBC QN AUTO: 29.8 PG (ref 24–34)
MCHC RBC AUTO-ENTMCNC: 34.3 G/DL (ref 31–37)
MCV RBC AUTO: 86.9 FL (ref 74–97)
MONOCYTES # BLD: 0.6 K/UL (ref 0.05–1.2)
MONOCYTES NFR BLD: 8 % (ref 3–10)
NEUTS SEG # BLD: 4.7 K/UL (ref 1.8–8)
NEUTS SEG NFR BLD: 61 % (ref 40–73)
PLATELET # BLD AUTO: 263 K/UL (ref 135–420)
PMV BLD AUTO: 10.6 FL (ref 9.2–11.8)
POTASSIUM SERPL-SCNC: 4.3 MMOL/L (ref 3.5–5.5)
PROT SERPL-MCNC: 7.1 G/DL (ref 6.4–8.2)
RBC # BLD AUTO: 5.27 M/UL (ref 4.7–5.5)
SODIUM SERPL-SCNC: 141 MMOL/L (ref 136–145)
TSH SERPL DL<=0.05 MIU/L-ACNC: 2.9 UIU/ML (ref 0.36–3.74)
VIT B12 SERPL-MCNC: 269 PG/ML (ref 211–911)
WBC # BLD AUTO: 7.7 K/UL (ref 4.6–13.2)

## 2019-04-11 PROCEDURE — 84425 ASSAY OF VITAMIN B-1: CPT

## 2019-04-11 PROCEDURE — 82306 VITAMIN D 25 HYDROXY: CPT

## 2019-04-11 PROCEDURE — 85025 COMPLETE CBC W/AUTO DIFF WBC: CPT

## 2019-04-11 PROCEDURE — 84443 ASSAY THYROID STIM HORMONE: CPT

## 2019-04-11 PROCEDURE — 36415 COLL VENOUS BLD VENIPUNCTURE: CPT

## 2019-04-11 PROCEDURE — 80053 COMPREHEN METABOLIC PANEL: CPT

## 2019-04-11 PROCEDURE — 82728 ASSAY OF FERRITIN: CPT

## 2019-04-11 PROCEDURE — 83540 ASSAY OF IRON: CPT

## 2019-04-11 PROCEDURE — 82607 VITAMIN B-12: CPT

## 2019-04-11 NOTE — LETTER
4/11/19 Patient: Patricia Jarquin YOB: 1970 Date of Visit: 4/11/2019 Brandin King MD 
[address] [address] VIA Facsimile: [phone] Dear Brandin King MD, Thank you for referring Mr. Patricia Jarquin to [place] [address] for evaluation. My notes for this consultation are attached. If you have questions, please do not hesitate to call me. I look forward to following your patient along with you.  
 
 
Sincerely, 
 
Olman Kellogg MD

## 2019-04-11 NOTE — H&P (VIEW-ONLY)
Preop History and Physical Exam: 
 
Brandon Lin is a 50 y.o. male who comes into the office today after completing the entirety of the bariatric preoperative protocol satisfactorily. he initially identified obesity at the age of 34 and at age 25 weighed 180lbs. he has tried a variety of unsupervised weight-loss attempts, but has yet to meet with lasting success. Today, the patient is Height: 6' (182.9 cm) tall, Weight: 152.4 kg (336 lb) lbs for a Body mass index is 45.57 kg/m². It is due to their severe obesity, which is further complicated by hypertension, hyperlipidemia, JOSHUA and weight related arthopathies  that the patient is now seeking out bariatric surgery, specifically, the gastric bypass Past Medical History:  
Diagnosis Date  Hypercholesterolemia  Hypertension  Sleep apnea   
 cpap Past Surgical History:  
Procedure Laterality Date  HX KNEE ARTHROSCOPY Left   
 torn meniscus  HX OTHER SURGICAL    
 wisdom teeth and implanted tooth Current Outpatient Medications Medication Sig Dispense Refill  lisinopril (PRINIVIL, ZESTRIL) 10 mg tablet take 1 tablet by mouth once daily  0  
 simvastatin (ZOCOR) 20 mg tablet   0  
 naproxen sodium (ALEVE) 220 mg cap Take 2 Tabs by mouth daily. No Known Allergies Social History Tobacco Use  Smoking status: Former Smoker Last attempt to quit: 1/10/2010 Years since quittin.2  Smokeless tobacco: Never Used Substance Use Topics  Alcohol use: Yes Comment: occasional  
 Drug use: No  
 
 
Family History Problem Relation Age of Onset  Kidney Disease Mother  Heart Disease Mother  Diabetes Mother  No Known Problems Father Family Status Relation Name Status  Mother  Alive  Father  Alive Review of Systems:  Positive in BOLD 
  
CONST: Fever, weight loss, fatigue or chills GI: Nausea, vomiting, abdominal pain, change in bowel habits, hematochezia, melena, and GERD INTEG: Dermatitis, abnormal moles HEENT: Recent changes in vision, vertigo, epistaxis, dysphagia and hoarseness CV: Chest pain, palpitations, HTN, edema and varicosities RESP: Cough, shortness of breath, wheezing, hemoptysis, snoring and reactive airway disease : Hematuria, dysuria, frequency, urgency, nocturia and stress urinary incontinence MS: Weakness, joint pain and arthritis ENDO: Diabetes, thyroid disease, polyuria, polydipsia, polyphagia, poor wound healing, heat intolerance, cold intolerance LYMPH/HEME: Anemia, bruising and history of blood transfusions NEURO: Dizziness, headache, fainting, seizures and stroke PSYCH: Anxiety and depression 
  
 
 
Physical Exam 
 
Visit Vitals Pulse 60 Temp 97.8 °F (36.6 °C) (Oral) Resp 16 Ht 6' (1.829 m) Wt 152.4 kg (336 lb) SpO2 96% BMI 45.57 kg/m² General: 52 y.o.) male in no acute distress. Morbidly obese in abdomen - android pattern HEENT: Normocephalic, atraumatic, Pupils equal and reactive, nasopharynx clear, oropharynx clear and moist without lesions NECK: Supple, no lymphadenopathy, thyromegaly, carotid bruits or jugular venous distension. trachea midline RESP: Clear to auscultation bilaterally, no wheezes, rhonchi, or rales, normal respiratory excursion CV: Regular rate and rhythm, no murmurs, rubs or gallops. 3+/4 pulses in bilateral dorsalis pedis and posterior tibialis. No distal edema or varicosities. ABD: Soft, nontender, nondistended, normoactive bowel sounds, no hernias, no hepatosplenomegaly, easily palpable costal margins, android distribution Extremities: Warm, well perfused, no tenderness or swelling, normal gait/station Neuro: Sensation and strength grossly intact and symmetrical 
Psych: Alert and oriented to person, place, and time. Studies: LABS: within normal limits except for hypovit D 
EKG: without significant abnormalities NUTRITIONAL EVALUATION has deemed the patient a good candidate for weight loss surgery PSYCHIATRIC EVALUATION has deemed the patient a good candidate for weight loss surgery Impression: 
 
Vicente Fernández is a 50 y.o. male who is suffering from morbid obesity and comorbidities including hypertension, hyperlipidemia, JOSHUA and weight related arthopathieswho would benefit from bariatric surgery. We've discussed the restrictive and malabsorptive nature of the gastric bypass and compared and contrasted with the sleeve gastrectomy. The patient understands the likelihood of losing approximately 80% of their excess weight in 12 to 18 months. He also understands the risks including but not limited to bleeding, infection, need for reoperation, anastomotic ulcers, leaks and strictures, bowel obstruction secondary to adhesions and internal hernias, DVT, PE, heart attack, stroke, and death. Patient also understands risks of inadequate weight loss, excess weight loss, vitamin insufficiency, protein malnutrition, excess skin, and loss of hair. We have reviewed the components of a successful postoperative course including requirement for a high protein, low carbohydrate diet, 60 oz a day of zero calorie liquids, daily vitamin supplementation, daily exercise, regular follow-up, and participation in support groups. We have reviewed the preoperative liver shrinking clear liquid diet, as well as reviewed any medication changes required while on the clear liquid diet. In addition, the patient understands that all medications to be taken during the first 8 weeks postoperatively can be taken whole as long as the medication is approximately the size of a Osvalod 325 mg aspirin tablet in size. The patient further understands that it is his/her responsibility to review these and verify with their primary care doctor and pharmacist that all medications are of the appropriate size.   We will schedule the patient for laparoscopic gastric bypass in the near future.

## 2019-04-11 NOTE — PROGRESS NOTES
Chief Complaint   Patient presents with    Follow-up     preop GBP     1. Have you been to the ER, urgent care clinic since your last visit? Hospitalized since your last visit? No    2. Have you seen or consulted any other health care providers outside of the [address] [place] [address] since your last visit? Include any pap smears or colon screening. No    Pt ID confirmed    Weight Loss Metrics 4/11/2019 6/13/2018 6/13/2018 1/10/2018 1/10/2018   Pre op / Initial Wt - 355 - 339 -   Today's Wt 336 lb - 355 lb - 339 lb   BMI 45.57 kg/m2 - 48.15 kg/m2 - 45.98 kg/m2   Ideal Body Wt - 164 - 164 -   Excess Body Wt - 191 - 175 -   Goal Wt - 202 - 199 -   Wt loss to date - 0 - 0 -   % Wt Loss - 0 - 0 -   80% EBW - 152.8 - 140 -       Body mass index is 45.57 kg/m².

## 2019-04-11 NOTE — PROGRESS NOTES
Preop History and Physical Exam:    Judd Díaz is a 50 y.o. male who comes into the office today after completing the entirety of the bariatric preoperative protocol satisfactorily. he initially identified obesity at the age of 34 and at age 25 weighed 180lbs. he has tried a variety of unsupervised weight-loss attempts, but has yet to meet with lasting success. Today, the patient is Height: 6' (182.9 cm) tall, Weight: 152.4 kg (336 lb) lbs for a Body mass index is 45.57 kg/m². It is due to their severe obesity, which is further complicated by hypertension, hyperlipidemia, JOSHUA and weight related arthopathies  that the patient is now seeking out bariatric surgery, specifically, the gastric bypass      Past Medical History:   Diagnosis Date    Hypercholesterolemia     Hypertension     Sleep apnea     cpap       Past Surgical History:   Procedure Laterality Date    HX KNEE ARTHROSCOPY Left     torn meniscus    HX OTHER SURGICAL      wisdom teeth and implanted tooth       Current Outpatient Medications   Medication Sig Dispense Refill    lisinopril (PRINIVIL, ZESTRIL) 10 mg tablet take 1 tablet by mouth once daily  0    simvastatin (ZOCOR) 20 mg tablet   0    naproxen sodium (ALEVE) 220 mg cap Take 2 Tabs by mouth daily.          No Known Allergies    Social History     Tobacco Use    Smoking status: Former Smoker     Last attempt to quit: 1/10/2010     Years since quittin.2    Smokeless tobacco: Never Used   Substance Use Topics    Alcohol use: Yes     Comment: occasional    Drug use: No       Family History   Problem Relation Age of Onset    Kidney Disease Mother     Heart Disease Mother     Diabetes Mother     No Known Problems Father      Family Status   Relation Name Status    Mother  [de-identified]    Father  Alive       Review of Systems:  Positive in BOLD     CONST: Fever, weight loss, fatigue or chills  GI: Nausea, vomiting, abdominal pain, change in bowel habits, hematochezia, melena, and GERD INTEG: Dermatitis, abnormal moles  HEENT: Recent changes in vision, vertigo, epistaxis, dysphagia and hoarseness  CV: Chest pain, palpitations, HTN, edema and varicosities  RESP: Cough, shortness of breath, wheezing, hemoptysis, snoring and reactive airway disease  : Hematuria, dysuria, frequency, urgency, nocturia and stress urinary incontinence   MS: Weakness, joint pain and arthritis  ENDO: Diabetes, thyroid disease, polyuria, polydipsia, polyphagia, poor wound healing, heat intolerance, cold intolerance  LYMPH/HEME: Anemia, bruising and history of blood transfusions  NEURO: Dizziness, headache, fainting, seizures and stroke  PSYCH: Anxiety and depression         Physical Exam    Visit Vitals  Pulse 60   Temp 97.8 °F (36.6 °C) (Oral)   Resp 16   Ht 6' (1.829 m)   Wt 152.4 kg (336 lb)   SpO2 96%   BMI 45.57 kg/m²         General: 52 y.o.) male in no acute distress. Morbidly obese in abdomen - android pattern  HEENT: Normocephalic, atraumatic, Pupils equal and reactive, nasopharynx clear, oropharynx clear and moist without lesions  NECK: Supple, no lymphadenopathy, thyromegaly, carotid bruits or jugular venous distension. trachea midline  RESP: Clear to auscultation bilaterally, no wheezes, rhonchi, or rales, normal respiratory excursion  CV: Regular rate and rhythm, no murmurs, rubs or gallops. 3+/4 pulses in bilateral dorsalis pedis and posterior tibialis. No distal edema or varicosities. ABD: Soft, nontender, nondistended, normoactive bowel sounds, no hernias, no hepatosplenomegaly, easily palpable costal margins, android distribution  Extremities: Warm, well perfused, no tenderness or swelling, normal gait/station  Neuro: Sensation and strength grossly intact and symmetrical  Psych: Alert and oriented to person, place, and time.           Studies:    LABS: within normal limits except for hypovit D  EKG: without significant abnormalities  NUTRITIONAL EVALUATION has deemed the patient a good candidate for weight loss surgery  PSYCHIATRIC EVALUATION has deemed the patient a good candidate for weight loss surgery    Impression:    Cintia Fuentes is a 50 y.o. male who is suffering from morbid obesity and comorbidities including hypertension, hyperlipidemia, JOSHUA and weight related arthopathieswho would benefit from bariatric surgery. We've discussed the restrictive and malabsorptive nature of the gastric bypass and compared and contrasted with the sleeve gastrectomy. The patient understands the likelihood of losing approximately 80% of their excess weight in 12 to 18 months. He also understands the risks including but not limited to bleeding, infection, need for reoperation, anastomotic ulcers, leaks and strictures, bowel obstruction secondary to adhesions and internal hernias, DVT, PE, heart attack, stroke, and death. Patient also understands risks of inadequate weight loss, excess weight loss, vitamin insufficiency, protein malnutrition, excess skin, and loss of hair. We have reviewed the components of a successful postoperative course including requirement for a high protein, low carbohydrate diet, 60 oz a day of zero calorie liquids, daily vitamin supplementation, daily exercise, regular follow-up, and participation in support groups. We have reviewed the preoperative liver shrinking clear liquid diet, as well as reviewed any medication changes required while on the clear liquid diet. In addition, the patient understands that all medications to be taken during the first 8 weeks postoperatively can be taken whole as long as the medication is approximately the size of a Osvaldo 325 mg aspirin tablet in size. The patient further understands that it is his/her responsibility to review these and verify with their primary care doctor and pharmacist that all medications are of the appropriate size. We will schedule the patient for laparoscopic gastric bypass in the near future.

## 2019-04-11 NOTE — PATIENT INSTRUCTIONS
Take the following medications as noted. - If the medication is circled, take with a sip of water on the morning of surgery prior to reporting to the hospital  - If the medication has a line drawn through it, do not take that medication after starting your liquid diet before surgery  - If the medication has a star beside it, change its dosing as written beside it on the date written beside it. Current Outpatient Medications   Medication Sig Dispense Refill    lisinopril (PRINIVIL, ZESTRIL) 10 mg tablet take 1 tablet by mouth once daily  0    simvastatin (ZOCOR) 20 mg tablet   0    naproxen sodium (ALEVE) 220 mg cap Take 2 Tabs by mouth daily.

## 2019-04-16 LAB — VIT B1 BLD-SCNC: 144.5 NMOL/L (ref 66.5–200)

## 2019-04-23 ENCOUNTER — DOCUMENTATION ONLY (OUTPATIENT)
Dept: SURGERY | Age: 49
End: 2019-04-23

## 2019-04-23 NOTE — PROGRESS NOTES
CLINICAL NUTRITION PRE-OPERATIVE EDUCATION    Patient's Name: Mirela Pineda   Age: 50 y.o. YOB: 1970   Sex: male    Education & Materials Provided:   x Excel Menu/Allowable Foods/ Shopping List   x  Supplemental Resource Guide: MVI, B12, Calcium Citrate, Vitamin D         recommendations  x  Protein Supplement Information  x  Fluid Requirements/ No Straws  x  No Caffeine or Carbonation   x  No alcohol for 1-Year Post-Op                     x  No Snacks or No Concentrated Sweets     x  Exercising   x  Support System at [facility] of Support Group meetings. Support System after surgery includes: x     x  Key Diet Principles            x  Addressed Current Habits/Changes to Make   x Patient has been educated on the liquid diet to begin 1 week prior to surgery. Attendance of support group: Yes      Summary:  Patient has completed  visits with an RD. During these nutrition visits, RD focused on dietary changes, behavior changes, and the importance of establishing an exercise routine. The diet protocol that patient was prescribed emphasized on low carbohydrates (less than 100 grams per day prior to surgery and 60-80 grams of protein per day). At today's session, patient was educated on the post-op diet protocol. Patient understands the importance of keeping total fat and sugar less than 3 grams per serving. Patient is aware of the transition of the diet stages and is aware that they will be on clear liquids for 7days, followed by soft protein for 5 weeks. Patient understands the body needs ~ 60-70 grams of protein per day. During the liquid phase, patient will need 60 grams of protein from shakes. Once eating soft protein, patient will only need 1 shake per day. Patient is aware of the importance of the vitamins and protein drinks. Patient has also been educated on the pre-op liquid diet, which will range from 1-2 weeks (depending on surgeon).   Patient understands that failure to comply in pre-op liquid diet could result in surgery being canceled. Patient's 6 week post-op nutrition appointment has been scheduled. Ok to proceed. At this 6 week visit, RD will assess how patient is tolerating soft protein and advance to vegetables, if tolerating soft protein without difficulty. Patient will also see RD again at 9 months post-op. This visit will assess patient's compliance with current protocol, including diet, vitamins, protein shakes, and exercise. Post-op diet guidelines will be reinforced. RD is available for questions and to meet with patient outside of the 6 week and 9 month post-op visit. We spent a lot of time talking about the vitamins. Patient understands the importance of being compliant with the diet protocol and the complications and risks that can occur if they are non-compliant with the nutritional protocol.          Shirlyn Snellen, REJI  4/23/2019

## 2019-04-24 ENCOUNTER — DOCUMENTATION ONLY (OUTPATIENT)
Dept: MEDSURG UNIT | Age: 49
End: 2019-04-24

## 2019-04-29 ENCOUNTER — ANESTHESIA EVENT (OUTPATIENT)
Dept: SURGERY | Age: 49
DRG: 621 | End: 2019-04-29
Payer: COMMERCIAL

## 2019-04-29 NOTE — PERIOP NOTES
PAT - SURGICAL PRE-ADMISSION INSTRUCTIONS    NAME:  Rosalva Freeman                                                          TODAY'S DATE:  4/29/2019    SURGERY DATE:  4/30/2019                                  SURGERY ARRIVAL TIME:   TBV    1. Do NOT eat or drink anything, including candy or gum, after MIDNIGHT on 04/29/2019 , unless you have specific instructions from your Surgeon or Anesthesia Provider to do so. 2. No smoking on the day of surgery. 3. No alcohol 24 hours prior to the day of surgery. 4. No recreational drugs for one week prior to the day of surgery. 5. Leave all valuables, including money/purse, at home. 6. Remove all jewelry, nail polish, makeup (including mascara); no lotions, powders, deodorant, or perfume/cologne/after shave. 7. Glasses/Contact lenses and Dentures may be worn to the hospital.  They will be removed prior to surgery. 8. Call your doctor if symptoms of a cold or illness develop within 24 ours prior to surgery. 9. AN ADULT MUST DRIVE YOU HOME AFTER OUTPATIENT SURGERY. 10. If you are having an OUTPATIENT procedure, please make arrangements for a responsible adult to be with you for 24 hours after your surgery. 11. If you are admitted to the hospital, you will be assigned to a bed after surgery is complete. Normally a family member will not be able to see you until you are in your assigned bed. 15. Family is encouraged to accompany you to the hospital.  We ask visitors in the treatment area to be limited to ONE person at a time to ensure patient privacy. EXCEPTIONS WILL BE MADE AS NEEDED. 15. Children under 12 are discouraged from entering the treatment area and need to be supervised by an adult when in the waiting room. Special Instructions:  NO NSAIDS        Patient Prep:    shower with anti-bacterial soap    These surgical instructions were reviewed with Rosalva Freeman during the PAT phone call. Directions:   On the morning of surgery, please go to the [facility]. Enter the building from the [facility] entrance, 1st floor (next to the Emergency Room entrance). Take the elevator to the 2nd floor. Sign in at the Registration Desk. If you have any questions and/or concerns, please do not hesitate to call:  (Prior to the day of surgery)  [facility] unit:  [phone]  (Day of surgery)  [facility] unit:  [phone] - SURGICAL PRE-ADMISSION INSTRUCTIONS    NAME:  Aparna See                                                          TODAY'S DATE:  4/29/2019    SURGERY DATE:  4/30/2019                                  SURGERY ARRIVAL TIME:   TBV    14. Do NOT eat or drink anything, including candy or gum, after MIDNIGHT on 04/29/2019 , unless you have specific instructions from your Surgeon or Anesthesia Provider to do so. 15. No smoking on the day of surgery. 16. No alcohol 24 hours prior to the day of surgery. 17. No recreational drugs for one week prior to the day of surgery. 18. Leave all valuables, including money/purse, at home. 19. Remove all jewelry, nail polish, makeup (including mascara); no lotions, powders, deodorant, or perfume/cologne/after shave. 20. Glasses/Contact lenses and Dentures may be worn to the hospital.  They will be removed prior to surgery. 21. Call your doctor if symptoms of a cold or illness develop within 24 ours prior to surgery. 22. AN ADULT MUST DRIVE YOU HOME AFTER OUTPATIENT SURGERY. 23. If you are having an OUTPATIENT procedure, please make arrangements for a responsible adult to be with you for 24 hours after your surgery. 24. If you are admitted to the hospital, you will be assigned to a bed after surgery is complete. Normally a family member will not be able to see you until you are in your assigned bed. 22. Family is encouraged to accompany you to the hospital.  We ask visitors in the treatment area to be limited to ONE person at a time to ensure patient privacy.   EXCEPTIONS WILL BE MADE AS NEEDED. 32. Children under 12 are discouraged from entering the treatment area and need to be supervised by an adult when in the waiting room. Special Instructions:    No NSAIDS    Patient Prep:    shower with anti-bacterial soap    These surgical instructions were reviewed with Celia Schaeffer during the PAT phone call. Directions: On the morning of surgery, please go to the [address] [place] [address]. Enter the building from the [facility] entrance, 1st floor (next to the Emergency Room entrance). Take the elevator to the 2nd floor. Sign in at the Registration Desk.     If you have any questions and/or concerns, please do not hesitate to call:  (Prior to the day of surgery)  [facility] unit:  [phone]  (Day of surgery)  [facility] unit:  [phone]

## 2019-04-30 ENCOUNTER — HOSPITAL ENCOUNTER (INPATIENT)
Age: 49
LOS: 1 days | Discharge: HOME | DRG: 621 | End: 2019-05-01
Attending: SURGERY | Admitting: SURGERY
Payer: COMMERCIAL

## 2019-04-30 ENCOUNTER — ANESTHESIA (OUTPATIENT)
Dept: SURGERY | Age: 49
DRG: 621 | End: 2019-04-30
Payer: COMMERCIAL

## 2019-04-30 DIAGNOSIS — E66.01 MORBID OBESITY WITH BMI OF 45.0-49.9, ADULT (HCC): Primary | ICD-10-CM

## 2019-04-30 PROCEDURE — 77030002996 HC SUT SLK J&J -A: Performed by: SURGERY

## 2019-04-30 PROCEDURE — 77030008477 HC STYL SATN SLP COVD -A: Performed by: ANESTHESIOLOGY

## 2019-04-30 PROCEDURE — 76010000133 HC OR TIME 3 TO 3.5 HR: Performed by: SURGERY

## 2019-04-30 PROCEDURE — 0D164ZA BYPASS STOMACH TO JEJUNUM, PERCUTANEOUS ENDOSCOPIC APPROACH: ICD-10-PCS | Performed by: SURGERY

## 2019-04-30 PROCEDURE — 74011250636 HC RX REV CODE- 250/636: Performed by: SURGERY

## 2019-04-30 PROCEDURE — 77030035051: Performed by: SURGERY

## 2019-04-30 PROCEDURE — 74011000250 HC RX REV CODE- 250

## 2019-04-30 PROCEDURE — 65270000029 HC RM PRIVATE

## 2019-04-30 PROCEDURE — 74011250636 HC RX REV CODE- 250/636

## 2019-04-30 PROCEDURE — 77030008603 HC TRCR ENDOSC EPATH J&J -C: Performed by: SURGERY

## 2019-04-30 PROCEDURE — 74011250637 HC RX REV CODE- 250/637: Performed by: SURGERY

## 2019-04-30 PROCEDURE — 74011250637 HC RX REV CODE- 250/637: Performed by: NURSE ANESTHETIST, CERTIFIED REGISTERED

## 2019-04-30 PROCEDURE — 77030016151 HC PROTCTR LNS DFOG COVD -B: Performed by: SURGERY

## 2019-04-30 PROCEDURE — 77030027138 HC INCENT SPIROMETER -A

## 2019-04-30 PROCEDURE — 77030039266 HC ADH SKN EXOFIN S2SG -A: Performed by: SURGERY

## 2019-04-30 PROCEDURE — 77030008437 HC REINF STRP REINF SEMGD WLGO -C: Performed by: SURGERY

## 2019-04-30 PROCEDURE — 77030031139 HC SUT VCRL2 J&J -A: Performed by: SURGERY

## 2019-04-30 PROCEDURE — 77030027876 HC STPLR ENDOSC FLX PWR J&J -G1: Performed by: SURGERY

## 2019-04-30 PROCEDURE — 74011250636 HC RX REV CODE- 250/636: Performed by: NURSE ANESTHETIST, CERTIFIED REGISTERED

## 2019-04-30 PROCEDURE — 77030009968 HC RELD STPLR ENDOSC J&J -D: Performed by: SURGERY

## 2019-04-30 PROCEDURE — 77030036732 HC RELD STPLR VASC J&J -F: Performed by: SURGERY

## 2019-04-30 PROCEDURE — 77030013079 HC BLNKT BAIR HGGR 3M -A: Performed by: ANESTHESIOLOGY

## 2019-04-30 PROCEDURE — 77030008683 HC TU ET CUF COVD -A: Performed by: ANESTHESIOLOGY

## 2019-04-30 PROCEDURE — 77030032490 HC SLV COMPR SCD KNE COVD -B: Performed by: SURGERY

## 2019-04-30 PROCEDURE — 76060000037 HC ANESTHESIA 3 TO 3.5 HR: Performed by: SURGERY

## 2019-04-30 PROCEDURE — 77030033639 HC SHR ENDO COAG HARM 36 J&J -E: Performed by: SURGERY

## 2019-04-30 PROCEDURE — 77030002933 HC SUT MCRYL J&J -A: Performed by: SURGERY

## 2019-04-30 PROCEDURE — 74011000250 HC RX REV CODE- 250: Performed by: SURGERY

## 2019-04-30 PROCEDURE — 77030008598 HC TRCR ENDOSC BLDLS J&J -B: Performed by: SURGERY

## 2019-04-30 PROCEDURE — 77030005518 HC CATH URETH FOL 2W BARD -B: Performed by: SURGERY

## 2019-04-30 PROCEDURE — 76210000016 HC OR PH I REC 1 TO 1.5 HR: Performed by: SURGERY

## 2019-04-30 PROCEDURE — 77030018831 HC SOL IRR H20 BAXT -A: Performed by: SURGERY

## 2019-04-30 PROCEDURE — 77030035048 HC TRCR ENDOSC OPTCL COVD -B: Performed by: SURGERY

## 2019-04-30 PROCEDURE — 77030027491 HC SHR ENDOSC COVD -B: Performed by: SURGERY

## 2019-04-30 RX ORDER — ONDANSETRON 2 MG/ML
4 INJECTION INTRAMUSCULAR; INTRAVENOUS
Status: DISCONTINUED | OUTPATIENT
Start: 2019-04-30 | End: 2019-05-01 | Stop reason: HOSPADM

## 2019-04-30 RX ORDER — GLYCOPYRROLATE 0.2 MG/ML
INJECTION INTRAMUSCULAR; INTRAVENOUS AS NEEDED
Status: DISCONTINUED | OUTPATIENT
Start: 2019-04-30 | End: 2019-04-30 | Stop reason: HOSPADM

## 2019-04-30 RX ORDER — MIDAZOLAM HYDROCHLORIDE 1 MG/ML
INJECTION, SOLUTION INTRAMUSCULAR; INTRAVENOUS AS NEEDED
Status: DISCONTINUED | OUTPATIENT
Start: 2019-04-30 | End: 2019-04-30 | Stop reason: HOSPADM

## 2019-04-30 RX ORDER — OXYCODONE HYDROCHLORIDE 5 MG/1
5 TABLET ORAL
Status: DISCONTINUED | OUTPATIENT
Start: 2019-04-30 | End: 2019-05-01 | Stop reason: HOSPADM

## 2019-04-30 RX ORDER — DIPHENHYDRAMINE HYDROCHLORIDE 50 MG/ML
25 INJECTION, SOLUTION INTRAMUSCULAR; INTRAVENOUS
Status: DISCONTINUED | OUTPATIENT
Start: 2019-04-30 | End: 2019-05-01 | Stop reason: HOSPADM

## 2019-04-30 RX ORDER — SODIUM CHLORIDE, SODIUM LACTATE, POTASSIUM CHLORIDE, CALCIUM CHLORIDE 600; 310; 30; 20 MG/100ML; MG/100ML; MG/100ML; MG/100ML
50 INJECTION, SOLUTION INTRAVENOUS CONTINUOUS
Status: DISCONTINUED | OUTPATIENT
Start: 2019-04-30 | End: 2019-04-30 | Stop reason: HOSPADM

## 2019-04-30 RX ORDER — ENOXAPARIN SODIUM 100 MG/ML
40 INJECTION SUBCUTANEOUS DAILY
Status: DISCONTINUED | OUTPATIENT
Start: 2019-05-01 | End: 2019-05-01 | Stop reason: HOSPADM

## 2019-04-30 RX ORDER — ONDANSETRON 2 MG/ML
INJECTION INTRAMUSCULAR; INTRAVENOUS AS NEEDED
Status: DISCONTINUED | OUTPATIENT
Start: 2019-04-30 | End: 2019-04-30 | Stop reason: HOSPADM

## 2019-04-30 RX ORDER — NALOXONE HYDROCHLORIDE 0.4 MG/ML
0.4 INJECTION, SOLUTION INTRAMUSCULAR; INTRAVENOUS; SUBCUTANEOUS AS NEEDED
Status: DISCONTINUED | OUTPATIENT
Start: 2019-04-30 | End: 2019-05-01 | Stop reason: HOSPADM

## 2019-04-30 RX ORDER — LIDOCAINE HYDROCHLORIDE 10 MG/ML
0.1 INJECTION, SOLUTION EPIDURAL; INFILTRATION; INTRACAUDAL; PERINEURAL AS NEEDED
Status: DISCONTINUED | OUTPATIENT
Start: 2019-04-30 | End: 2019-04-30 | Stop reason: HOSPADM

## 2019-04-30 RX ORDER — ACETAMINOPHEN 120 MG/1
SUPPOSITORY RECTAL AS NEEDED
Status: DISCONTINUED | OUTPATIENT
Start: 2019-04-30 | End: 2019-04-30 | Stop reason: HOSPADM

## 2019-04-30 RX ORDER — FAMOTIDINE 20 MG/1
20 TABLET, FILM COATED ORAL ONCE
Status: COMPLETED | OUTPATIENT
Start: 2019-04-30 | End: 2019-04-30

## 2019-04-30 RX ORDER — HYDROMORPHONE HYDROCHLORIDE 1 MG/ML
1 INJECTION, SOLUTION INTRAMUSCULAR; INTRAVENOUS; SUBCUTANEOUS
Status: DISCONTINUED | OUTPATIENT
Start: 2019-04-30 | End: 2019-05-01 | Stop reason: HOSPADM

## 2019-04-30 RX ORDER — FENTANYL CITRATE 50 UG/ML
INJECTION, SOLUTION INTRAMUSCULAR; INTRAVENOUS AS NEEDED
Status: DISCONTINUED | OUTPATIENT
Start: 2019-04-30 | End: 2019-04-30 | Stop reason: HOSPADM

## 2019-04-30 RX ORDER — ENOXAPARIN SODIUM 100 MG/ML
40 INJECTION SUBCUTANEOUS ONCE
Status: COMPLETED | OUTPATIENT
Start: 2019-04-30 | End: 2019-04-30

## 2019-04-30 RX ORDER — SODIUM CHLORIDE, SODIUM LACTATE, POTASSIUM CHLORIDE, CALCIUM CHLORIDE 600; 310; 30; 20 MG/100ML; MG/100ML; MG/100ML; MG/100ML
150 INJECTION, SOLUTION INTRAVENOUS CONTINUOUS
Status: DISCONTINUED | OUTPATIENT
Start: 2019-04-30 | End: 2019-05-01 | Stop reason: HOSPADM

## 2019-04-30 RX ORDER — ACETAMINOPHEN 650 MG/1
650 SUPPOSITORY RECTAL ONCE
Status: DISCONTINUED | OUTPATIENT
Start: 2019-04-30 | End: 2019-04-30 | Stop reason: HOSPADM

## 2019-04-30 RX ORDER — ACETAMINOPHEN 325 MG/1
650 TABLET ORAL EVERY 6 HOURS
Status: DISCONTINUED | OUTPATIENT
Start: 2019-04-30 | End: 2019-05-01 | Stop reason: HOSPADM

## 2019-04-30 RX ORDER — PROPOFOL 10 MG/ML
INJECTION, EMULSION INTRAVENOUS AS NEEDED
Status: DISCONTINUED | OUTPATIENT
Start: 2019-04-30 | End: 2019-04-30 | Stop reason: HOSPADM

## 2019-04-30 RX ORDER — HYDROMORPHONE HYDROCHLORIDE 2 MG/ML
0.5 INJECTION, SOLUTION INTRAMUSCULAR; INTRAVENOUS; SUBCUTANEOUS
Status: DISCONTINUED | OUTPATIENT
Start: 2019-04-30 | End: 2019-04-30 | Stop reason: HOSPADM

## 2019-04-30 RX ORDER — SUCCINYLCHOLINE CHLORIDE 20 MG/ML
INJECTION INTRAMUSCULAR; INTRAVENOUS AS NEEDED
Status: DISCONTINUED | OUTPATIENT
Start: 2019-04-30 | End: 2019-04-30 | Stop reason: HOSPADM

## 2019-04-30 RX ORDER — SODIUM CHLORIDE, SODIUM LACTATE, POTASSIUM CHLORIDE, CALCIUM CHLORIDE 600; 310; 30; 20 MG/100ML; MG/100ML; MG/100ML; MG/100ML
125 INJECTION, SOLUTION INTRAVENOUS CONTINUOUS
Status: DISCONTINUED | OUTPATIENT
Start: 2019-04-30 | End: 2019-04-30 | Stop reason: HOSPADM

## 2019-04-30 RX ORDER — LISINOPRIL 10 MG/1
10 TABLET ORAL DAILY
Status: DISCONTINUED | OUTPATIENT
Start: 2019-05-01 | End: 2019-05-01 | Stop reason: HOSPADM

## 2019-04-30 RX ORDER — HYDROMORPHONE HYDROCHLORIDE 2 MG/ML
INJECTION, SOLUTION INTRAMUSCULAR; INTRAVENOUS; SUBCUTANEOUS AS NEEDED
Status: DISCONTINUED | OUTPATIENT
Start: 2019-04-30 | End: 2019-04-30 | Stop reason: HOSPADM

## 2019-04-30 RX ORDER — KETOROLAC TROMETHAMINE 30 MG/ML
15 INJECTION, SOLUTION INTRAMUSCULAR; INTRAVENOUS EVERY 6 HOURS
Status: COMPLETED | OUTPATIENT
Start: 2019-04-30 | End: 2019-05-01

## 2019-04-30 RX ORDER — LIDOCAINE HYDROCHLORIDE 20 MG/ML
INJECTION, SOLUTION EPIDURAL; INFILTRATION; INTRACAUDAL; PERINEURAL AS NEEDED
Status: DISCONTINUED | OUTPATIENT
Start: 2019-04-30 | End: 2019-04-30 | Stop reason: HOSPADM

## 2019-04-30 RX ORDER — DEXAMETHASONE SODIUM PHOSPHATE 4 MG/ML
INJECTION, SOLUTION INTRA-ARTICULAR; INTRALESIONAL; INTRAMUSCULAR; INTRAVENOUS; SOFT TISSUE AS NEEDED
Status: DISCONTINUED | OUTPATIENT
Start: 2019-04-30 | End: 2019-04-30 | Stop reason: HOSPADM

## 2019-04-30 RX ORDER — ONDANSETRON 2 MG/ML
4 INJECTION INTRAMUSCULAR; INTRAVENOUS ONCE
Status: DISCONTINUED | OUTPATIENT
Start: 2019-04-30 | End: 2019-04-30 | Stop reason: HOSPADM

## 2019-04-30 RX ORDER — FENTANYL CITRATE 50 UG/ML
50 INJECTION, SOLUTION INTRAMUSCULAR; INTRAVENOUS AS NEEDED
Status: DISCONTINUED | OUTPATIENT
Start: 2019-04-30 | End: 2019-04-30 | Stop reason: HOSPADM

## 2019-04-30 RX ORDER — NEOSTIGMINE METHYLSULFATE 5 MG/5 ML
SYRINGE (ML) INTRAVENOUS AS NEEDED
Status: DISCONTINUED | OUTPATIENT
Start: 2019-04-30 | End: 2019-04-30 | Stop reason: HOSPADM

## 2019-04-30 RX ORDER — HYOSCYAMINE SULFATE 0.12 MG/1
0.12 TABLET, ORALLY DISINTEGRATING ORAL
Status: DISCONTINUED | OUTPATIENT
Start: 2019-04-30 | End: 2019-05-01 | Stop reason: HOSPADM

## 2019-04-30 RX ADMIN — HYDROMORPHONE HYDROCHLORIDE 1 MG: 2 INJECTION, SOLUTION INTRAMUSCULAR; INTRAVENOUS; SUBCUTANEOUS at 08:11

## 2019-04-30 RX ADMIN — KETOROLAC TROMETHAMINE 15 MG: 30 INJECTION, SOLUTION INTRAMUSCULAR at 12:33

## 2019-04-30 RX ADMIN — FAMOTIDINE 20 MG: 20 TABLET, FILM COATED ORAL at 06:41

## 2019-04-30 RX ADMIN — FENTANYL CITRATE 100 MCG: 50 INJECTION, SOLUTION INTRAMUSCULAR; INTRAVENOUS at 07:38

## 2019-04-30 RX ADMIN — LIDOCAINE HYDROCHLORIDE 50 MG: 20 INJECTION, SOLUTION EPIDURAL; INFILTRATION; INTRACAUDAL; PERINEURAL at 07:38

## 2019-04-30 RX ADMIN — PROPOFOL 200 MG: 10 INJECTION, EMULSION INTRAVENOUS at 07:38

## 2019-04-30 RX ADMIN — HYDROMORPHONE HYDROCHLORIDE 1 MG: 2 INJECTION, SOLUTION INTRAMUSCULAR; INTRAVENOUS; SUBCUTANEOUS at 07:51

## 2019-04-30 RX ADMIN — SODIUM CHLORIDE, SODIUM LACTATE, POTASSIUM CHLORIDE, AND CALCIUM CHLORIDE 50 ML/HR: 600; 310; 30; 20 INJECTION, SOLUTION INTRAVENOUS at 06:41

## 2019-04-30 RX ADMIN — ONDANSETRON 4 MG: 2 INJECTION INTRAMUSCULAR; INTRAVENOUS at 07:57

## 2019-04-30 RX ADMIN — ENOXAPARIN SODIUM 40 MG: 40 INJECTION SUBCUTANEOUS at 06:41

## 2019-04-30 RX ADMIN — MIDAZOLAM HYDROCHLORIDE 2 MG: 1 INJECTION, SOLUTION INTRAMUSCULAR; INTRAVENOUS at 07:32

## 2019-04-30 RX ADMIN — SODIUM CHLORIDE, SODIUM LACTATE, POTASSIUM CHLORIDE, AND CALCIUM CHLORIDE: 600; 310; 30; 20 INJECTION, SOLUTION INTRAVENOUS at 08:11

## 2019-04-30 RX ADMIN — KETOROLAC TROMETHAMINE 15 MG: 30 INJECTION, SOLUTION INTRAMUSCULAR at 18:30

## 2019-04-30 RX ADMIN — GLYCOPYRROLATE 0.4 MG: 0.2 INJECTION INTRAMUSCULAR; INTRAVENOUS at 10:19

## 2019-04-30 RX ADMIN — Medication 3 MG: at 10:19

## 2019-04-30 RX ADMIN — CEFAZOLIN 3 G: 1 INJECTION, POWDER, FOR SOLUTION INTRAMUSCULAR; INTRAVENOUS; PARENTERAL at 07:40

## 2019-04-30 RX ADMIN — SUCCINYLCHOLINE CHLORIDE 100 MG: 20 INJECTION INTRAMUSCULAR; INTRAVENOUS at 07:38

## 2019-04-30 RX ADMIN — DEXAMETHASONE SODIUM PHOSPHATE 4 MG: 4 INJECTION, SOLUTION INTRA-ARTICULAR; INTRALESIONAL; INTRAMUSCULAR; INTRAVENOUS; SOFT TISSUE at 07:57

## 2019-04-30 RX ADMIN — KETOROLAC TROMETHAMINE 15 MG: 30 INJECTION, SOLUTION INTRAMUSCULAR at 23:59

## 2019-04-30 RX ADMIN — SODIUM CHLORIDE, SODIUM LACTATE, POTASSIUM CHLORIDE, AND CALCIUM CHLORIDE: 600; 310; 30; 20 INJECTION, SOLUTION INTRAVENOUS at 09:04

## 2019-04-30 RX ADMIN — ACETAMINOPHEN 650 MG: 325 TABLET, FILM COATED ORAL at 16:32

## 2019-04-30 RX ADMIN — ACETAMINOPHEN 650 MG: 325 TABLET, FILM COATED ORAL at 21:38

## 2019-04-30 NOTE — INTERVAL H&P NOTE
H&P Update:  Do Venegas III was seen and examined. History and physical has been reviewed. The patient has been examined.  There have been no significant clinical changes since the completion of the originally dated History and Physical.

## 2019-04-30 NOTE — OP NOTES
DATE: 4/30/2019    PREOPERATIVE DIAGNOSIS: Clinically severe obesity, body mass index of 46,   comorbidities of hypertension, hyperlipidemia, JOSHUA and weight related arthopathies      . POSTOPERATIVE DIAGNOSIS: Clinically severe obesity, body mass index of 46,   comorbidities of hypertension, hyperlipidemia, JOSHUA and weight related arthopathies        PROCEDURES: Laparoscopic Baljinder-en-Y gastric bypass with 845 cm retrocolic   retrogastric Baljinder limb, 40 cm biliopancreatic limb, 15 ml    tubularized gastric pouch applied to the lesser curvature of stomach  SURGEON: Dr. Ranjan Galvez. Donald Andujar MD, FACS   ASSISTANT: Aishwarya Bolden SA  ANESTHESIA: General endotracheal anesthesia. Local anesthetic mixture 1%   lidocaine, 0.5% Marcaine, with epinephrine injected locally utilizing 100  mL. FINDINGS: None  SPECIMEN: None  IMPLANTS: * No implants in log *  ESTIMATED BLOOD LOSS: 25 ml  FLUIDS: 2500 ml   URINE OUTPUT: 150 ml   DRAIN: None  COMPLICATIONS: None  OPERATIVE START TIME: 0756  OPERATIVE COMPLETION TIME: 1015    DESCRIPTION OF PROCEDURE: The patient was prepped and draped in standard sterile fashion after being placed under general anesthetic. A site was selected superior to the umbilicus in the midline. This area was incised. A RunMyProcess 5  mm Optical trocar was placed over the laparoscope and directed into the abdominal cavity using Optiview technique. Abdomen was insufflated to 15 mmHg and surveyed. There was no evidence of injury from the entry. Additional trocars were then placed. My assistant place one 5 mm trocar was placed in the anterior axillary line left upper quadrant approximately 3 fingerbreadths below the costal margin and another 12 mm trocar was placed in the lateral portion of the left rectus sheath approximately 3 fingerbreadths lateral to the umbilical trocar.   I then placed another 12 mm trocar was placed approximately 4 fingerbreadths lateral to the umbilical trocar in the  lateral portion of right rectus sheath. All were placed after incising with scalpel, all were placed using blunt dissecting technique. There was no evidence of injury from the entries. Instrument were placed in the abdominal cavity. The omentum and transverse colon were retracted superiorly, exposing ligament of Treitz. The small bowel was measured 40 cm distal to the ligament of Treitz, divided at this level with A 60 mm Eagle stapler. Harmonic dissection was used to continue the division to the base of the mesentery, confirming preservation of blood flow to the small bowel above and below the staple line prior to firing. Then, from the distal staple line, the Baljinder limb was measured 100 cm distal and an antimesenteric enterotomy was made. Another antimesenteric enterotomy was made just proximal to the proximal staple line of the biliopancreatic limb. Through these enterotomies, a 60 mm Eagle stapler was placed into the bowel, clamped on the antimesenteric borders and fired to form a stapled side-to-side anastomosis. The remaining enterotomy was closed in a running inverting fashion with 3-0 Vicryl suture. The mesenteric defect was then closed with running 2-0 silk suture, extending on the bowel wall in a running Lembert fashion for second layer closure of the enterotomy. At this point, attention was directed to the transverse mesocolon. While my assistant exposed the bare area of the transverse mesentery above the ligament of Trietz. A site was selected just anterior to the ligament of Treitz. This area was incised, entering the lesser sac. The Baljinder limb was then passed through the fenestration of the transverse mesocolon and into the lesser sac taking care not to twist on its mesentery. At this point, the omentum and transverse colon were retracted inferiorly.  The greater curvature of the stomach was then grasped and the gastrocolic ligament was retracted by my assistant and I then  dissected the ligament directly off of the wall of the stomach using the Harmonic scalpel to widely open the lesser sac. Adhesions between the posterior wall of the stomach and the retroperitoneum were taken down under direct vision to fully free the lesser sac. At this point, an incision was made near the xiphoid. Through this incision, a Kevin retractor was placed through the abdominal wall beneath the left lateral segment of the liver and connected to a bedside retraction system allowing exposure of the esophageal hiatus. While my assistant retracted the stomach and lesser omentum inferiorly, I dissected the angle of His  anterior to posterior down the left sari of the diaphragm using Harmonic scalpel to assist in eventual division of the gastric pouch and distal stomach remnant. The lesser curvature of the stomach was then examined. A site was selected just proximal to the crow's foot of the vagus nerve in this area. The gastrohepatic ligament was dissected away from the lesser curvature of the stomach directly on the wall of the stomach to enter the lesser sac. This fenestration was then widened using Bovie cautery over a 10 mm articulating esophageal retractor which had been placed through the lesser sac and brought through the fenestration. Then, a 60 mm Lanett stapler was placed transversely across the stomach through this fenestration, clamped and then fired to begin the formation of the gastric pouch. Another stapler was placed near the crotch of the previous staple line and directed superiorly toward the angle of His, and clamped. Prior to firing, a 34-Belarusian orogastric Yari tube was brought through the esophagus into the stomach so its tip was against the transverse staple line, its course lying along the lesser curvature of stomach. The stapler was snugged against it and then fired.  Then, a 60 mm Lanett stapler with Seam Guard reinforcement was placed in the crotch of the previous staple line and clamped and then fired from the crotch of previous staple line directed superiorly toward the angle of His until the gastric pouch was fully divided from the distal stomach remnant. Once fully divided, the staple lines were examined, noted to be hemostatic and viable. The lesser omentum was placed between the staple lines to prevent gastro-gastric fistulization. The tip of the gastric pouch placed in the lesser sac and the distal stomach remnant was retracted anteriorly to allow exposure of the lesser sac. The Baljinder limb was examined. The proximal 6 cm were noted to be tethered by its mesentery. This proximal segment was elevated by my assistant and then I excised from the mesentery using a Harmonic scalpel and then one additional staple load was used to divide the devascularized segment from the remainder of the Baljinder limb. This segment was brought out of the abdominal cavity via one of the trocar sites, passed off the field and discarded. The baljinder limb mesentery was examined to confirm there was no twisting of the mesentery prior to sewing the anastomosis    At this point, the gastrojejunostomy was begun by sewing the right antimesenteric border of the Baljinder limb to the distal portion of the gastric pouch using running suture of 3-0 Vicryl. An anterior gastrotomy and antimesenteric enterotomy were made. From the left apex of these 2 enterotomies, a 3-0 Vicryl suture was placed and run on the posterior surface in a running inverting fashion to the right apex, transitioned on the anterior surface and sewn approximately half way across the opening in an inverting fashion. Then, another 3-0 Vicryl suture was placed at the left apex and sewn to the previous suture in a running inverting fashion on the anterior surface. Prior to tying these sutures, the Yari tube was brought across the anastomosis, then the sutures were tied, completing the inner layer.  Then, from the left apex another 3-0 Vicryl suture was placed and run to the right apex in a running Lembert fashion completing the anterior outer layer os the anastomosis, thereby completing the anastomosis. Once this was complete, the Yari tube was removed from the patient. Then, working from within the lesser sac, the Uriel limb was sewn to the right anterior surface of the transverse mesocolic defect with  3 interrupted sutures of 2-0 silk. Then, the left side of the uriel limb mesentery was closed to the left side of the tranverse mesocolic defect with a running suture of 2-0 silk while my assistant exposed the defects from within the lesser sac. The omentum and transverse colon were retracted superiorly. The base of the left side of the transverse mesocolic defect was exposed by my assistant and this was sewn to the base of the left side of the Uriel limb mesentery with a pursestring suture of 2-0 silk. The Uriel limb was then retracted to the left. The right-side of the  transverse mesocolic defect was exposed by my assistant and closed to the right side of the Uirel limb mesentery with another pursestring suture of 2-0 silk, then one additional interrupted suture was placed between the right lateral surface of the Uriel limb and the right lateral surface of the transverse mesocolic defect, completing the closure of the defect around the Uriel limb. Once this was complete, both mesenteric defects were examined and noted to be well closed. Both anastomoses were examined and noted to be hemostatic and viable without evidence of leak. The omentum and transverse colon were retracted inferiorly back to normal position. The gastric remnant was placed over the anastomosis, returning the anastomosis into the lesser sac. The Kevin retractor was removed. At this point, the abdomen was desufflated via the remaining trocars. All trocars were then removed.  The trocar sites were rendered hemostatic with Bovie cautery and then closed by my assistant with interrupted 4-0 Monocryl deep dermal sutures. Dermabond was applied as wound dressings. The patient tolerated the procedure well.

## 2019-04-30 NOTE — PROGRESS NOTES
Problem: Discharge Planning  Goal: *Discharge to safe environment  Outcome: Progressing Towards Goal

## 2019-04-30 NOTE — PROGRESS NOTES
1215 patient received via bed, alert x4, o2 at 2liters  cpap placed on patient  oob walking in moralez, tolerated well  No complaints of pain or nausea  Voided 600 cc  Report given to Ruby Jacobson with sbar

## 2019-04-30 NOTE — ANESTHESIA PREPROCEDURE EVALUATION
Relevant Problems No relevant active problems Anesthetic History No history of anesthetic complications Review of Systems / Medical History Patient summary reviewed and pertinent labs reviewed Pulmonary Sleep apnea: CPAP Neuro/Psych Within defined limits Cardiovascular Exercise tolerance: >4 METS 
  
GI/Hepatic/Renal 
Within defined limits Endo/Other Morbid obesity Other Findings Physical Exam 
 
Airway Mallampati: III 
TM Distance: 4 - 6 cm Neck ROM: normal range of motion Mouth opening: Normal 
 
 Cardiovascular Regular rate and rhythm,  S1 and S2 normal,  no murmur, click, rub, or gallop Rhythm: regular Rate: normal 
 
 
 
 Dental 
 
Dentition: Lower dentition intact and Upper dentition intact Pulmonary Breath sounds clear to auscultation Abdominal 
GI exam deferred Other Findings Anesthetic Plan ASA: 3 Anesthesia type: general 
 
 
 
 
Induction: Intravenous Anesthetic plan and risks discussed with: Patient

## 2019-04-30 NOTE — PROGRESS NOTES
Chart reviewed and patient verified demographics. He has [employer] and dr Mike Bell is PCP, last seen him in January. Patient lives alone and is independent with ADL. His only DME is his CPAP which is in his room. He has friends and relatives to support him once home. His sister will drive and participate in dc process. His plan is home. Reason for Admission:  Laparoscopic Baljinder-Y Gastric Bypass                     RRAT Score:         0           Plan for utilizing home health:  Not at this time                       Current Advanced Directive/Advance Care Plan: no    Likelihood of Readmission:  Low/green                         Transition of Care Plan:      home               Patient has designated _____sister__________ to participate in his/her discharge plan and to receive any needed information. Name: Deejay Claudio  Address:  Phone number: [phone]  Note: he also has his dad as Sasha Cottrell [phone]    Care Management Interventions  PCP Verified by CM: Yes(seen PCP is January)  Palliative Care Criteria Met (RRAT>21 & CHF Dx)?: No  Mode of Transport at Discharge: Other (see comment)(sister)  Transition of Care Consult (CM Consult): Discharge Planning  MyChart Signup: No  Discharge Durable Medical Equipment: No  Physical Therapy Consult: No  Occupational Therapy Consult: No  Speech Therapy Consult: No  Current Support Network: Lives Alone  Confirm Follow Up Transport: Family(sister)   Resource Information Provided?: No  Discharge Location  Discharge Placement: Home     DEDE Delacruz  [facility]  [phone], Pager [phone]

## 2019-04-30 NOTE — PROGRESS NOTES
conducted a pre-surgery visit with Meggan Infante, who is a 50 y.o.,male. The  provided the following Interventions:  Initiated a relationship of care and support. Offered prayer and assurance of continued prayers on patient's behalf. Plan:  Chaplains will continue to follow and will provide pastoral care on an as needed/requested basis.  recommends bedside caregivers page  on duty if patient shows signs of acute spiritual or emotional distress.     Ingrid Olvera   Spiritual Care   [phone]

## 2019-04-30 NOTE — ANESTHESIA POSTPROCEDURE EVALUATION
Procedure(s): LAPAROSCOPIC uriel-en-y GASTRIC BYPASS. general 
 
Anesthesia Post Evaluation Multimodal analgesia: multimodal analgesia used between 6 hours prior to anesthesia start to PACU discharge Patient location during evaluation: bedside Patient participation: complete - patient participated Level of consciousness: awake Pain score: 0 Pain management: adequate Airway patency: patent Anesthetic complications: no 
Cardiovascular status: stable Respiratory status: acceptable Hydration status: acceptable Post anesthesia nausea and vomiting:  none Vitals Value Taken Time /52 4/30/2019 11:55 AM  
Temp 36.6 °C (97.9 °F) 4/30/2019 11:39 AM  
Pulse 68 4/30/2019 11:55 AM  
Resp 18 4/30/2019 11:55 AM  
SpO2 89 % 4/30/2019 11:55 AM  
Vitals shown include unvalidated device data.

## 2019-05-01 VITALS
OXYGEN SATURATION: 91 % | HEART RATE: 49 BPM | SYSTOLIC BLOOD PRESSURE: 132 MMHG | HEIGHT: 72 IN | WEIGHT: 315 LBS | DIASTOLIC BLOOD PRESSURE: 75 MMHG | BODY MASS INDEX: 42.66 KG/M2 | RESPIRATION RATE: 16 BRPM | TEMPERATURE: 97.5 F

## 2019-05-01 LAB
ANION GAP SERPL CALC-SCNC: 10 MMOL/L (ref 3–18)
BUN SERPL-MCNC: 13 MG/DL (ref 7–18)
BUN/CREAT SERPL: 14 (ref 12–20)
CALCIUM SERPL-MCNC: 8.5 MG/DL (ref 8.5–10.1)
CHLORIDE SERPL-SCNC: 100 MMOL/L (ref 100–108)
CO2 SERPL-SCNC: 26 MMOL/L (ref 21–32)
CREAT SERPL-MCNC: 0.93 MG/DL (ref 0.6–1.3)
ERYTHROCYTE [DISTWIDTH] IN BLOOD BY AUTOMATED COUNT: 12.8 % (ref 11.6–14.5)
GLUCOSE SERPL-MCNC: 86 MG/DL (ref 74–99)
HCT VFR BLD AUTO: 40.5 % (ref 36–48)
HGB BLD-MCNC: 13.8 G/DL (ref 13–16)
MCH RBC QN AUTO: 29.4 PG (ref 24–34)
MCHC RBC AUTO-ENTMCNC: 34.1 G/DL (ref 31–37)
MCV RBC AUTO: 86.2 FL (ref 74–97)
PLATELET # BLD AUTO: 237 K/UL (ref 135–420)
PMV BLD AUTO: 11.5 FL (ref 9.2–11.8)
POTASSIUM SERPL-SCNC: 4.4 MMOL/L (ref 3.5–5.5)
RBC # BLD AUTO: 4.7 M/UL (ref 4.7–5.5)
SODIUM SERPL-SCNC: 136 MMOL/L (ref 136–145)
WBC # BLD AUTO: 10 K/UL (ref 4.6–13.2)

## 2019-05-01 PROCEDURE — 36415 COLL VENOUS BLD VENIPUNCTURE: CPT

## 2019-05-01 PROCEDURE — 74011250636 HC RX REV CODE- 250/636: Performed by: SURGERY

## 2019-05-01 PROCEDURE — 74011000250 HC RX REV CODE- 250: Performed by: SURGERY

## 2019-05-01 PROCEDURE — 85027 COMPLETE CBC AUTOMATED: CPT

## 2019-05-01 PROCEDURE — 80048 BASIC METABOLIC PNL TOTAL CA: CPT

## 2019-05-01 PROCEDURE — C9113 INJ PANTOPRAZOLE SODIUM, VIA: HCPCS | Performed by: SURGERY

## 2019-05-01 PROCEDURE — 74011250637 HC RX REV CODE- 250/637: Performed by: SURGERY

## 2019-05-01 RX ORDER — ENOXAPARIN SODIUM 100 MG/ML
40 INJECTION SUBCUTANEOUS DAILY
Qty: 7 SYRINGE | Refills: 0 | Status: SHIPPED
Start: 2019-05-01 | End: 2019-05-16 | Stop reason: ALTCHOICE

## 2019-05-01 RX ORDER — SODIUM CHLORIDE 0.9 % (FLUSH) 0.9 %
5-40 SYRINGE (ML) INJECTION AS NEEDED
Status: DISCONTINUED | OUTPATIENT
Start: 2019-05-01 | End: 2019-05-01 | Stop reason: HOSPADM

## 2019-05-01 RX ORDER — ONDANSETRON 4 MG/1
4 TABLET, ORALLY DISINTEGRATING ORAL
Qty: 10 TAB | Refills: 0 | Status: SHIPPED | OUTPATIENT
Start: 2019-05-01 | End: 2019-05-16

## 2019-05-01 RX ORDER — OXYCODONE HYDROCHLORIDE 5 MG/1
5 TABLET ORAL
Qty: 12 TAB | Refills: 0 | Status: SHIPPED | OUTPATIENT
Start: 2019-05-01 | End: 2019-05-04

## 2019-05-01 RX ORDER — SODIUM CHLORIDE 0.9 % (FLUSH) 0.9 %
5-40 SYRINGE (ML) INJECTION EVERY 8 HOURS
Status: DISCONTINUED | OUTPATIENT
Start: 2019-05-01 | End: 2019-05-01 | Stop reason: HOSPADM

## 2019-05-01 RX ADMIN — ACETAMINOPHEN 650 MG: 325 TABLET, FILM COATED ORAL at 10:38

## 2019-05-01 RX ADMIN — SODIUM CHLORIDE, SODIUM LACTATE, POTASSIUM CHLORIDE, AND CALCIUM CHLORIDE 150 ML/HR: 600; 310; 30; 20 INJECTION, SOLUTION INTRAVENOUS at 00:00

## 2019-05-01 RX ADMIN — SODIUM CHLORIDE, SODIUM LACTATE, POTASSIUM CHLORIDE, AND CALCIUM CHLORIDE 150 ML/HR: 600; 310; 30; 20 INJECTION, SOLUTION INTRAVENOUS at 09:06

## 2019-05-01 RX ADMIN — SODIUM CHLORIDE 40 MG: 9 INJECTION, SOLUTION INTRAMUSCULAR; INTRAVENOUS; SUBCUTANEOUS at 09:04

## 2019-05-01 RX ADMIN — ENOXAPARIN SODIUM 40 MG: 40 INJECTION SUBCUTANEOUS at 09:03

## 2019-05-01 RX ADMIN — ACETAMINOPHEN 650 MG: 325 TABLET, FILM COATED ORAL at 03:41

## 2019-05-01 RX ADMIN — KETOROLAC TROMETHAMINE 15 MG: 30 INJECTION, SOLUTION INTRAMUSCULAR at 05:41

## 2019-05-01 RX ADMIN — LISINOPRIL 10 MG: 10 TABLET ORAL at 09:03

## 2019-05-01 NOTE — PROGRESS NOTES
Awake sitting up in chair with no complaints. Patient was educated on progression of diet this AM. Goal of 4 ounces per hour with one ounce being protein was clearly understood. Patient was instructed to go slow with small sips to reach goal. Patient given a report card to record intake. Education completed on I.S use and to ambulate in moralez at least 4 times. Will follow up and check progression.

## 2019-05-01 NOTE — PROGRESS NOTES
Surgery Progress Note    5/1/2019    Admit Date: 4/30/2019    Subjective:     Patient has complaints of none. Pain is controlled with current regimen. Patient has been ambulating in halls. He reports no nausea and no vomiting and is tolerating ice chips well. Objective:     Blood pressure 114/71, pulse (!) 50, temperature 97.6 °F (36.4 °C), resp. rate 16, height 6' (1.829 m), weight 152 kg (335 lb), SpO2 94 %. No intake/output data recorded. 04/29 1901 - 05/01 0700  In: 4787.5 [P.O.:270; I.V.:4517.5]  Out: 1805 [Urine:1780]    EXAM: GENERAL: alert, pleasant, no distress   HEART: regular rate and rhythm   LUNGS: clear to auscultation   ABDOMEN:  Soft, obese, appropriately tender, nondistended, incisions clean, dry, no erythema or drainage   EXTREMITIES: warm, well perfused    Data Review    Recent Results (from the past 24 hour(s))   CBC W/O DIFF    Collection Time: 05/01/19  5:10 AM   Result Value Ref Range    WBC 10.0 4.6 - 13.2 K/uL    RBC 4.70 4.70 - 5.50 M/uL    HGB 13.8 13.0 - 16.0 g/dL    HCT 40.5 36.0 - 48.0 %    MCV 86.2 74.0 - 97.0 FL    MCH 29.4 24.0 - 34.0 PG    MCHC 34.1 31.0 - 37.0 g/dL    RDW 12.8 11.6 - 14.5 %    PLATELET 392 407 - 225 K/uL    MPV 11.5 9.2 - 13.7 FL   METABOLIC PANEL, BASIC    Collection Time: 05/01/19  5:10 AM   Result Value Ref Range    Sodium 136 136 - 145 mmol/L    Potassium 4.4 3.5 - 5.5 mmol/L    Chloride 100 100 - 108 mmol/L    CO2 26 21 - 32 mmol/L    Anion gap 10 3.0 - 18 mmol/L    Glucose 86 74 - 99 mg/dL    BUN 13 7.0 - 18 MG/DL    Creatinine 0.93 0.6 - 1.3 MG/DL    BUN/Creatinine ratio 14 12 - 20      GFR est AA >60 >60 ml/min/1.73m2    GFR est non-AA >60 >60 ml/min/1.73m2    Calcium 8.5 8.5 - 10.1 MG/DL       Assessment:   Geraldo Giang III is a 50 y.o. male, postop day 1 status post laparoscopic gastric bypass surgery.   Condition: good    Plan:   -Ambulate every four hours  -Oxycodone 5mg 1-2 tabs po every 4-6 hour prn pain uncontrolled by tylenol  -Advance to Clear liquid Gastric Bypass Diet, if able to tolerate clear liquid diet 4oz per hour one of which being a protein supplement, will discharge home later today      Danii Ang MS,  PA-C

## 2019-05-01 NOTE — PROGRESS NOTES
Bedside shift report received from Aquiles [address].  With sbar, patient oob on chair, [place] [address] in to see patient  Patient instructed on giving self Lovenox, verbalizes understanding  Started on bariatric clears

## 2019-05-01 NOTE — PROGRESS NOTES
Problem: Pain  Goal: *Control of Pain  Outcome: Progressing Towards Goal     Problem: Patient Education: Go to Patient Education Activity  Goal: Patient/Family Education  Outcome: Progressing Towards Goal     Problem: Patient Education: Go to Patient Education Activity  Goal: Patient/Family Education  Outcome: Progressing Towards Goal     Problem: Discharge Planning  Goal: *Discharge to safe environment  Outcome: Progressing Towards Goal     Problem: Falls - Risk of  Goal: *Absence of Falls  Description  Document Francisco Javier Mcmahon Fall Risk and appropriate interventions in the flowsheet.   Outcome: Progressing Towards Goal     Problem: Patient Education: Go to Patient Education Activity  Goal: Patient/Family Education  Outcome: Progressing Towards Goal

## 2019-05-01 NOTE — DISCHARGE INSTRUCTIONS
DISCHARGE SUMMARY from Nurse    PATIENT INSTRUCTIONS:    After general anesthesia or intravenous sedation, for 24 hours or while taking prescription Narcotics:  · Limit your activities  · Do not drive and operate hazardous machinery  · Do not make important personal or business decisions  · Do  not drink alcoholic beverages  · If you have not urinated within 8 hours after discharge, please contact your surgeon on call. Report the following to your surgeon:  · Excessive pain, swelling, redness or odor of or around the surgical area  · Temperature over 100.5  · Nausea and vomiting lasting longer than 4 hours or if unable to take medications  · Any signs of decreased circulation or nerve impairment to extremity: change in color, persistent  numbness, tingling, coldness or increase pain  · Any questions    What to do at Home:  Recommended activity: Activity as tolerated,       *  Please give a list of your current medications to your Primary Care Provider. *  Please update this list whenever your medications are discontinued, doses are      changed, or new medications (including over-the-counter products) are added. *  Please carry medication information at all times in case of emergency situations. These are general instructions for a healthy lifestyle:    No smoking/ No tobacco products/ Avoid exposure to second hand smoke  Surgeon General's Warning:  Quitting smoking now greatly reduces serious risk to your health. Obesity, smoking, and sedentary lifestyle greatly increases your risk for illness    A healthy diet, regular physical exercise & weight monitoring are important for maintaining a healthy lifestyle    You may be retaining fluid if you have a history of heart failure or if you experience any of the following symptoms:  Weight gain of 3 pounds or more overnight or 5 pounds in a week, increased swelling in our hands or feet or shortness of breath while lying flat in bed.   Please call your doctor as soon as you notice any of these symptoms; do not wait until your next office visit. Recognize signs and symptoms of STROKE:    F-face looks uneven    A-arms unable to move or move unevenly    S-speech slurred or non-existent    T-time-call 911 as soon as signs and symptoms begin-DO NOT go       Back to bed or wait to see if you get better-TIME IS BRAIN. Warning Signs of HEART ATTACK     Call 911 if you have these symptoms:   Chest discomfort. Most heart attacks involve discomfort in the center of the chest that lasts more than a few minutes, or that goes away and comes back. It can feel like uncomfortable pressure, squeezing, fullness, or pain.  Discomfort in other areas of the upper body. Symptoms can include pain or discomfort in one or both arms, the back, neck, jaw, or stomach.  Shortness of breath with or without chest discomfort.  Other signs may include breaking out in a cold sweat, nausea, or lightheadedness. Don't wait more than five minutes to call 911 - MINUTES MATTER! Fast action can save your life. Calling 911 is almost always the fastest way to get lifesaving treatment. Emergency Medical Services staff can begin treatment when they arrive -- up to an hour sooner than if someone gets to the hospital by car. The discharge information has been reviewed with the patient. The patient verbalized understanding. Discharge medications reviewed with the patient and appropriate educational materials and side effects teaching were provided. ___________________________________________________________________________________________________________________________________Hydration  Hydration is your NUMBER ONE priority. Dehydration is the most common reason for readmission to the hospital. Dehydration occurs when  your body does not get enough fluid to keep it functioning at its best. Your body also requires fluid  to burn its stored fat calories for energy.   Carry a bottle of water with you all day, even when you are away from home; remind yourself to  drink even if you dont feel thirsty. Drinking 64 ounces of fluid is your daily goal. You can tell if  youre getting enough fluid if youre making clear, light-colored urine five to 10 times per day. Signs of dehydration can be thirst, headache, hard stools or dizziness upon sitting or standing up. You should contact your surgeons office if you are unable to drink enough fluid to stay hydrated. --   [address] after Surgery   No lifting over 15 pounds for four weeks.  No driving while taking the pain medication (about seven to 10 days).  No tub baths, swimming or hot tubs until incisions are healed (about two weeks).  You may shower. Clean incisions daily /gently with soap and check incisions for signs of infection:  -- Redness around incision. -- Swelling at site. -- Drainage with an foul odor (pus). -- Increase tenderness around incision.  Take your temperature and resting pulse in the morning and evening. Record on tracking form  given to you. Call if your temperature is greater than 101 or your pulse rate is greater than 115.  Please contact your surgeon if you are having excessive abdominal pain (that lasts longer than  four hours and does not improve with prescribed pain medication), vomiting or shortness of breath.  Get up and move -- do not sit in one place for more than an hour.  You need to WALK (EXERCISE) for 30 minutes per day. -- Walking around your house does not count. -- Bike, treadmill and elliptical are OK. -- NO weight lifting or sit ups.  If constipated take an adult dose of Miralax (available over the counter). Contact the doctors  office if Miralax doesnt help.    You may swallow pills starting the day after surgery as long as they fit inside this [place]:   Continue to use your incentive spirometer (breathing machine) for the next couple of weeks to  help prevent pneumonia. [address]  Temperature/Heart Rate Log  Take your temperature and heart rate (pulse) twice a day for 14 days. Take both in the morning and  evening at about the same time each day (when you wake up and before you go to bed when you  are relaxed). Please contact your doctors office if your:   Temperature is higher than 101 degrees.  Heart rate (pulse) is higher than 115 beats per minute  (normal heart rate is 60 to 100 beats per minute). How to Take Your Heart Rate (Pulse)   Turn your left hand so that your palm is face-up.  With the index and middle fingers of your right  hand, draw a line from the base of your thumb to  just below the crease in your wrist. Your fingers  should nestle just to the left of the large tendon that  pops up when you bend your wrist toward you.  Dont press too hard, that will make the pulse go  away. Use gentle pressure.  Wait. It can take several seconds -- and several micro-adjustments in the placement of your two  fingers on your wrist -- to find your pulse. Just keep moving your fingers down or up your wrist  in small increments (and pausing for a few seconds) until you find it. How to Take Your Pulse Rate   Find a watch with a second hand and place it on your right wrist or on the table next  to your left hand.  After finding your pulse, count the number of beats for 20 seconds.  Multiply by three to get your heart rate, or beats per minute  (or just count for 60 seconds for a math-free option).  Normal, resting heart rate is about 60 to 100 beats per minute. -- 55 --  PATIENT GUIDE TO BARIATRIC [facility] Bijan Willams Self Injection Guide  Prepare  Step 1: Wash and dry your hands thoroughly. Step 2: Sit or lie in a comfortable position and choose an area  on the right or left side of the abdomen at least two inches away  from the belly button.   Step 3: Clean the injection site with an alcohol swab and let dry. Inject  Step 4: Remove the needle cap by pulling it straight off the syringe and  discard it in a sharps . Step 5: With your other hand, pinch an inch of the cleansed area to  make a fold in the skin. Insert the full length of the needle straight  down -- at a 90? angle -- into the fold of skin. -- 50 --  PATIENT GUIDE TO BARIATRIC [address] [place] [address] Rd  Step 6: Press the plunger with your thumb until the syringe is empty. Then pull the needle straight out and release the skin fold. Dispose  Step 7: Point the needle down and away from yourself and others,  and then push down on the plunger to activate the safety shield. Step 8: Place the used syringe in the sharps . Do NOT expel the air bubble from the syringe before the injection. Administration should be alternated between the left and right abdominal wall. The whole length  of the needle should be introduced into a skin fold held between the thumb and forefinger; the  skin fold should be held throughout the injection. To minimize bruising, do not rub the injection  site after completion of the injection. Questions about LOVENOX? Call [phone]  -- 49 --  PATIENT GUIDE TO [address] [place] [address]  9. DIET AND LIFESTYLE  Key Diet Principles Following Bariatric Surgery   Begin each meal with soft moist high protein foods (i.e. chicken, turkey, yogurt, tuna, eggs,  cottage cheese, other fish and seafood).  Consume a minimum of 64 ounces of fluid each day to prevent dehydration. No straws.  No food and fluid together. Stop drinking 30 minutes before a meal. You may begin fluids again  30 minutes after you finish a meal.   Eat very slowly and chew all foods completely.  Keep portions small.  No simple sugars or high fat foods. No carbonated beverages. No caffeine.  Eat three meals per day. No skipping. Avoid snacking between meals.  No alcohol.  No smoking.  Two Flintstones Complete Chewable vitamins each day. Take one in the morning and one at night.  1,500 milligrams Calcium Citrate per day in separate dosages.  Vitamin D 3: 5,000 IU taken per day.  Vitamin B-12: Take 1,000 micrograms sublingually daily.  Iron: 60 milligrams per day from [facility].  Protein supplements of your choice. Must be low sugar (0 to 3 grams), low fat (0 to 3 grams) and  provide at least 35 to 40 grams of protein each day. You need 60 to 70 grams of protein (food  and supplements) each day.  Minimum of 30 minutes of physical activity daily. Do not sumit NSAIDS . Do not take Steroids without your surgeons permission. -- 48 --  [address]  Your Priorities After Surgery  ? Fluid: 64 ounces of fluid per day. ? Protein: 60 to 70 grams of protein per day. ? Walk every day. Clear Liquid Diet  One week of clear liquids: minimum of 64 ounces of fluid per day. Fluid:   Zero calorie liquids.  No caffeine.  No carbonation.  No sugary drinks.  No alcohol.  No straws. Food   Protein drinks.  Less than 3 grams of sugar and  3 grams of fat per serving.  Protein drink should provide you with  60 to 70 grams of protein. Soft Protein Diet  Five weeks of soft protein (1 ounce for soft protein, 3 ounces of yogurt/cottage cheese). Three meals per day and 1 protein shake. Protein shakes should provide you with 30 grams of  protein on the soft protein diet. Slow transition:   First week on soft protein diet -- focus on yogurt, cottage cheese, eggs, vegetarian refried beans,  black beans, kidney beans and white beans. (NO BAKED BEANS.)   Second through fourth week on soft protein diet -- focus on yogurt, cottage cheese, eggs,  canned tuna, canned chicken, tilapia and fish (needs to be soft enough to be cut up with a fork).    Fifth week on soft protein diet -- focus on yogurt, cottage cheese, eggs, canned tuna, canned  chicken, tilapia, fish, salmon, chicken breast or turkey. Fluid is your #1 Priority! Continue clear liquids between meals. You will need 64 ounces of fluid per day. Fluids that you can have include:   Water.  Zero calorie liquids. You will need to sip throughout the day and should therefore have a water bottle with you at all  times! No liquids with your meals. Stop 30 minutes before a meal and wait 30 minutes after a meal.  No straws. Zero calorie liquids. No caffeine. No carbonation. No sugary drinks. No alcohol. -- 46 --  [facility]  Protein  You will need 60 to 70 grams of protein per day.  60 to 70 grams of protein shakes when on the clear liquid diet (two to three shakes per day).  30 to 50 grams of protein shakes when on the soft protein diet (one shake per day). Eat Three Times Per Day  You will need to eat three times per day. My planned times are:  _________________________________________________________  _________________________________________________________  _________________________________________________________  Nausea, Vomiting, Stomach Pain  If you have problems with nausea, vomiting or stomach pain, try:   Eating slowly: 20 to 30 minutes per meal.   Chewing food thoroughly: 20 to 30 chews before food is swallowed.  Small portions: measure portions in medicine cup.  Stopping before feeling full.  AVOIDING SUGAR and FRIED FOOD: sugar will cause dumping syndrome and lead to weight gain. Exercise  I will need to get a minimum of 30 minutes of exercise per day or 150 minutes of exercise per week.  Walking, swimming, biking or elliptical.   Find something you enjoy! Vitamins  After surgery, you will need to take the following vitamins for the rest of your life -- FOREVER.  Vitamin D 3: 5,000 IU per day.  Calcium Citrate: 1,500 milligrams, taken separately.    Flintstones Complete: two per day, taken separately.  Sublingual Vitamin B-12: 1,000 micrograms daily.  Iron for menstruating women or patients with a history of low iron: 65 milligrams daily. We recommend going to [email or web address]. com and purchasing iron from there. The lemon-lime has 60 milligrams. This iron is better absorbed than over-the-counter iron. -- 46 --  PATIENT GUIDE TO [address]  Clear Liquid Log  Getting your fluid in is top priority during this week. Fluids (MINIUM of 64 ounces per day):  ? 8 oz. ? 8 oz. ? 8 oz. ? 8 oz. ? 8 oz. ? 8 oz. ? 8 oz. ? 8 oz. ? 8 oz. ? 8 oz. ? 8 oz. ? 8 oz. Flintstones Complete Chewable: ? a.m. ? p.m. Calcium Citrate (1,500 milligrams/day):  Pill form  ? Two crushed pills (morning) ? Two crushed pills (afternoon) ? Two crushed pills (evening)  OR Upcal D (powder)  ? One pack/scoop ? One pack/scoop ? One pack/scoop  OR Celebrate Chewable Vitamins (500 mg each) or B[facility] Advantage Chewables (500 mg)  ? One chewable (morning) ? One chewable (afternoon) ? One chewable (evening)  OR Liquid Calcium Citrate  ? 1 tbsp. Calcium Citrate ? 1 tbsp. Calcium Citrate ? 1 tbsp. Calcium Citrate  Vitamin D3: ? 5,000 IU daily. Vitamin B-12: ? 1,000 micrograms per day. Iron (menstruating women or patients with a history of low iron):  ? 60 milligrams per day from [facility]. Protein drinks (protein drinks should be under 3 grams of sugar and 3 grams of fat). Protein shake (60 grams per day): ? a.m. ? p.m. Exercise: ? 30 to 40 minutes per day. -- 48 --  PATIENT GUIDE TO [address]  Bariatric Soft and Moist Diet Shopping List   alcium Citrate (1,500 milligrams/day):  Pill form  ? Two crushed pills (morning) ? Two crushed pills (afternoon) ? Two crushed pills (evening)  OR Upcal D (powder)  ? One pack/scoop ? One pack/scoop ?  One pack/scoop  OR Celebrate Chewable Vitamins (500 mg each) or Bariatric Advantage Chewables (500 mg)  ? One chewable (morning) ? One chewable (afternoon) ? One chewable (evening)  OR Liquid Calcium Citrate  ? 1 tbsp. Calcium Citrate ? 1 tbsp. Calcium Citrate ? 1 tbsp. Calcium Citrate  Vitamin D3: ? 5,000 IU daily  Vitamin B-12: ? 1,000 micrograms per day  Iron (menstruating women or  patients with a history of low iron):  ? 60 milligrams per day  from [facility]  Protein drinks (protein drinks should be under  3 grams of sugar and 3 grams of fat). Protein shake (30 to 40 grams per day):  ? a.m. ? p.m. Exercise: ? 30 to 40 minutes per  Educated on Diet Progression    [facility] Gastric Bypass and Sleeve Dietary Progression    Patient Name:   Date of Surgery: Ice Chips start once admitted on floor. Begin Bariatric Clear Liquid Diet on:     Clear Liquid Diet: 64 oz. of fluid per day  o Low calorie, low sugar, non-carbonated beverages  - Water, Crystal Light, Propel Water, Sugar Free Jell-O, Sugar Free Popsicles, Bouillon  - Start protein supplement during this stage. (60-70 grams per day)  - Getting your fluid in and staying hydrated is your #1 priority! - The clear liquid diet will last for 7 days. Begin Bariatric Soft and Moist on:   - This stage of the diet will last for 5 weeks, unless otherwise instructed by your surgeon. - Begin:  1 week post-op   - End:  6 weeks post-op (or when you follow up with the Registered Dietitian)    - Soft, moist, high protein foods: 3 meals per day plus protein supplements. o   Portions should emphasize on soft protein. o Portions will be a MAXIMUM of:  o  1 ounce of solid food  o  2-3 ounces of cottage cheese and yogurt. o Protein supplements should be between meals and provide 30-40 grams per day during soft protein diet. o Continue to get 64 ounces of fluid in per day. - Protein foods that are ok on the Soft and Moist Diet include:  o Slow transition:  o 1st week on soft protein should focus on:  Yogurt, cottage cheese, eggs  o 2nd -4th  week on soft protein diet should focus on: yogurt, cottage cheese, eggs, canned tuna, canned chicken, tilapia, fish (needs to be soft enough to be cut up with a fork)  o 5th week on soft protein diet should focus on: Yogurt, cottage cheese, eggs, canned tuna, canned chicken, tilapia, fish, salmon, chicken breast, or turkey. Remember to continue to get 64 ounces of fluid daily on ALL Stages. To be advanced to Bariatric Maintenance Stage of the bariatric diet, follow up with the dietitian 6 weeks post-op, around:         For any additional questions, please refer to your blue binder that was provided to you at the start of the bariatric program.

## 2019-05-01 NOTE — PROGRESS NOTES
Bedside and Verbal shift change report given to Cindy Arellano RN (oncoming nurse) by Vidya Ruelas RN (offgoing nurse). Report included the following information SBAR, Kardex, Intake/Output and MAR. Laying in the bed watching tv a&o denied acute distress at this time call bell within reach will continue to monitor. 2040 Up walking in the hallway voiced no complaint denied dizziness/n/v. Lap sites c/d/i.    0120 Sleeping soundly with CPAP on NAD noted call bell within reach will continue to monitor. 0225 Pt remain stable voiced no complaint continue tolerating ice chips denied n/v, up to bathroom denied dizziness pain tolerable \"just a little sore no pain\"     0515 up walking in the hallway voiced no complaint back to room and sitting on recliner chair watching tv call bell within reach. Bedside and Verbal shift change report given to Messi Sampson (oncoming nurse) by Cindy Arellano RN (offgoing nurse). Report included the following information SBAR, Kardex, Intake/Output, MAR and Recent Results.

## 2019-05-01 NOTE — PROGRESS NOTES
Patient was educated on all discharge instructions below. His questions were answered and he understood. Goals met  Hydration  Hydration is your NUMBER ONE priority. Dehydration is the most common reason for readmission to the hospital. Dehydration occurs when  your body does not get enough fluid to keep it functioning at its best. Your body also requires fluid  to burn its stored fat calories for energy. Carry a bottle of water with you all day, even when you are away from home; remind yourself to  drink even if you dont feel thirsty. Drinking 64 ounces of fluid is your daily goal. You can tell if  youre getting enough fluid if youre making clear, light-colored urine five to 10 times per day. Signs of dehydration can be thirst, headache, hard stools or dizziness upon sitting or standing up. You should contact your surgeons office if you are unable to drink enough fluid to stay hydrated. --   [address] after Surgery   No lifting over 15 pounds for four weeks.  No driving while taking the pain medication (about seven to 10 days).  No tub baths, swimming or hot tubs until incisions are healed (about two weeks).  You may shower. Clean incisions daily /gently with soap and check incisions for signs of infection:  -- Redness around incision. -- Swelling at site. -- Drainage with an foul odor (pus). -- Increase tenderness around incision.  Take your temperature and resting pulse in the morning and evening. Record on tracking form  given to you. Call if your temperature is greater than 101 or your pulse rate is greater than 115.  Please contact your surgeon if you are having excessive abdominal pain (that lasts longer than  four hours and does not improve with prescribed pain medication), vomiting or shortness of breath.  Get up and move -- do not sit in one place for more than an hour.  You need to WALK (EXERCISE) for 30 minutes per day.   -- Walking around your house does not count. -- Bike, treadmill and elliptical are OK. -- NO weight lifting or sit ups.  If constipated take an adult dose of Miralax (available over the counter). Contact the doctors  office if Miralax doesnt help.  You may swallow pills starting the day after surgery as long as they fit inside this [place]:   Continue to use your incentive spirometer (breathing machine) for the next couple of weeks to  help prevent pneumonia. [address] [facility]  Temperature/Heart Rate Log  Take your temperature and heart rate (pulse) twice a day for 14 days. Take both in the morning and  evening at about the same time each day (when you wake up and before you go to bed when you  are relaxed). Please contact your doctors office if your:   Temperature is higher than 101 degrees.  Heart rate (pulse) is higher than 115 beats per minute  (normal heart rate is 60 to 100 beats per minute). How to Take Your Heart Rate (Pulse)   Turn your left hand so that your palm is face-up.  With the index and middle fingers of your right  hand, draw a line from the base of your thumb to  just below the crease in your wrist. Your fingers  should nestle just to the left of the large tendon that  pops up when you bend your wrist toward you.  Dont press too hard, that will make the pulse go  away. Use gentle pressure.  Wait. It can take several seconds -- and several micro-adjustments in the placement of your two  fingers on your wrist -- to find your pulse. Just keep moving your fingers down or up your wrist  in small increments (and pausing for a few seconds) until you find it. How to Take Your Pulse Rate   Find a watch with a second hand and place it on your right wrist or on the table next  to your left hand.  After finding your pulse, count the number of beats for 20 seconds.    Multiply by three to get your heart rate, or beats per minute  (or just count for 60 seconds for a math-free option).  Normal, resting heart rate is about 60 to 100 beats per minute. -- 55 --  PATIENT GUIDE TO [address]  Lovenox Self Injection Guide  Prepare  Step 1: Wash and dry your hands thoroughly. Step 2: Sit or lie in a comfortable position and choose an area  on the right or left side of the abdomen at least two inches away  from the belly button. Step 3: Clean the injection site with an alcohol swab and let dry. Inject  Step 4: Remove the needle cap by pulling it straight off the syringe and  discard it in a sharps . Step 5: With your other hand, pinch an inch of the cleansed area to  make a fold in the skin. Insert the full length of the needle straight  down -- at a 90? angle -- into the fold of skin. -- 50 --  PATIENT GUIDE TO [address]  Step 6: Press the plunger with your thumb until the syringe is empty. Then pull the needle straight out and release the skin fold. Dispose  Step 7: Point the needle down and away from yourself and others,  and then push down on the plunger to activate the safety shield. Step 8: Place the used syringe in the sharps . Do NOT expel the air bubble from the syringe before the injection. Administration should be alternated between the left and right abdominal wall. The whole length  of the needle should be introduced into a skin fold held between the thumb and forefinger; the  skin fold should be held throughout the injection. To minimize bruising, do not rub the injection  site after completion of the injection. Questions about LOVENOX? Call [phone]  -- 49 --  PATIENT GUIDE TO [address] [place] [address]  9. DIET AND LIFESTYLE  Key Diet Principles Following Bariatric Surgery   Begin each meal with soft moist high protein foods (i.e. chicken, turkey, yogurt, tuna, eggs,  cottage cheese, other fish and seafood).    Consume a minimum of 64 ounces of fluid each day to prevent dehydration. No straws.  No food and fluid together. Stop drinking 30 minutes before a meal. You may begin fluids again  30 minutes after you finish a meal.   Eat very slowly and chew all foods completely.  Keep portions small.  No simple sugars or high fat foods. No carbonated beverages. No caffeine.  Eat three meals per day. No skipping. Avoid snacking between meals.  No alcohol. No smoking.  Two Flintstones Complete Chewable vitamins each day. Take one in the morning and one at night.  1,500 milligrams Calcium Citrate per day in separate dosages.  Vitamin D 3: 5,000 IU taken per day.  Vitamin B-12: Take 1,000 micrograms sublingually daily.  Iron: 60 milligrams per day from [facility] [employer].  Protein supplements of your choice. Must be low sugar (0 to 3 grams), low fat (0 to 3 grams) and  provide at least 35 to 40 grams of protein each day. You need 60 to 70 grams of protein (food  and supplements) each day.  Minimum of 30 minutes of physical activity daily. Do not sumit NSAIDS . Do not take Steroids without your surgeons permission. -- 48 --  [address] [facility]  Your Priorities After Surgery  ? Fluid: 64 ounces of fluid per day. ? Protein: 60 to 70 grams of protein per day. ? Walk every day. Clear Liquid Diet  One week of clear liquids: minimum of 64 ounces of fluid per day. Fluid:   Zero calorie liquids.  No caffeine.  No carbonation.  No sugary drinks.  No alcohol.  No straws. Food   Protein drinks.  Less than 3 grams of sugar and  3 grams of fat per serving.  Protein drink should provide you with  60 to 70 grams of protein. Soft Protein Diet  Five weeks of soft protein (1 ounce for soft protein, 3 ounces of yogurt/cottage cheese). Three meals per day and 1 protein shake. Protein shakes should provide you with 30 grams of  protein on the soft protein diet.   Slow transition:   First week on soft protein diet -- focus on yogurt, cottage cheese, eggs, vegetarian refried beans,  black beans, kidney beans and white beans. (NO BAKED BEANS.)   Second through fourth week on soft protein diet -- focus on yogurt, cottage cheese, eggs,  canned tuna, canned chicken, tilapia and fish (needs to be soft enough to be cut up with a fork).  Fifth week on soft protein diet -- focus on yogurt, cottage cheese, eggs, canned tuna, canned  chicken, tilapia, fish, salmon, chicken breast or turkey. Fluid is your #1 Priority! Continue clear liquids between meals. You will need 64 ounces of fluid per day. Fluids that you can have include:   Water.  Zero calorie liquids. You will need to sip throughout the day and should therefore have a water bottle with you at all  times! No liquids with your meals. Stop 30 minutes before a meal and wait 30 minutes after a meal.  No straws. Zero calorie liquids. No caffeine. No carbonation. No sugary drinks. No alcohol. -- 46 --  [address] [facility]  Protein  You will need 60 to 70 grams of protein per day.  60 to 70 grams of protein shakes when on the clear liquid diet (two to three shakes per day).  30 to 50 grams of protein shakes when on the soft protein diet (one shake per day). Eat Three Times Per Day  You will need to eat three times per day. My planned times are:  _________________________________________________________  _________________________________________________________  _________________________________________________________  Nausea, Vomiting, Stomach Pain  If you have problems with nausea, vomiting or stomach pain, try:   Eating slowly: 20 to 30 minutes per meal.   Chewing food thoroughly: 20 to 30 chews before food is swallowed.  Small portions: measure portions in medicine cup.  Stopping before feeling full.    AVOIDING SUGAR and FRIED FOOD: sugar will cause dumping syndrome and lead to weight gain. Exercise  I will need to get a minimum of 30 minutes of exercise per day or 150 minutes of exercise per week.  Walking, swimming, biking or elliptical.   Find something you enjoy! Vitamins  After surgery, you will need to take the following vitamins for the rest of your life -- FOREVER.  Vitamin D 3: 5,000 IU per day.  Calcium Citrate: 1,500 milligrams, taken separately.  Flintstones Complete: two per day, taken separately.  Sublingual Vitamin B-12: 1,000 micrograms daily.  Iron for menstruating women or patients with a history of low iron: 65 milligrams daily. We recommend going to www.bariatricadNjuiceage. [place] and purchasing iron from there. The lemon-lime has 60 milligrams. This iron is better absorbed than over-the-counter iron. -- 46 --  PATIENT GUIDE TO BARIATRIC [address] [place] [address] Rd  Clear Liquid Log  Getting your fluid in is top priority during this week. Fluids (MINIUM of 64 ounces per day):  ? 8 oz. ? 8 oz. ? 8 oz. ? 8 oz. ? 8 oz. ? 8 oz. ? 8 oz. ? 8 oz. ? 8 oz. ? 8 oz. ? 8 oz. ? 8 oz. Flintstones Complete Chewable: ? a.m. ? p.m. Calcium Citrate (1,500 milligrams/day):  Pill form  ? Two crushed pills (morning) ? Two crushed pills (afternoon) ? Two crushed pills (evening)  OR Upcal D (powder)  ? One pack/scoop ? One pack/scoop ? One pack/scoop  OR Celebrate Chewable Vitamins (500 mg each) or Bariatric Advantage Chewables (500 mg)  ? One chewable (morning) ? One chewable (afternoon) ? One chewable (evening)  OR Liquid Calcium Citrate  ? 1 tbsp. Calcium Citrate ? 1 tbsp. Calcium Citrate ? 1 tbsp. Calcium Citrate  Vitamin D3: ? 5,000 IU daily. Vitamin B-12: ? 1,000 micrograms per day. Iron (menstruating women or patients with a history of low iron):  ? 60 milligrams per day from [facility] [employer]. Protein drinks (protein drinks should be under 3 grams of sugar and 3 grams of fat). Protein shake (60 grams per day): ? a.m. ? p.m.  Exercise: ? 30 to 40 minutes per day. -- 48 --  PATIENT GUIDE TO BARIATRIC [address]  Bariatric Soft and Moist Diet Shopping List   alcium Citrate (1,500 milligrams/day):  Pill form  ? Two crushed pills (morning) ? Two crushed pills (afternoon) ? Two crushed pills (evening)  OR Upcal D (powder)  ? One pack/scoop ? One pack/scoop ? One pack/scoop  OR Celebrate Chewable Vitamins (500 mg each) or Bariatric Advantage Chewables (500 mg)  ? One chewable (morning) ? One chewable (afternoon) ? One chewable (evening)  OR Liquid Calcium Citrate  ? 1 tbsp. Calcium Citrate ? 1 tbsp. Calcium Citrate ? 1 tbsp. Calcium Citrate  Vitamin D3: ? 5,000 IU daily  Vitamin B-12: ? 1,000 micrograms per day  Iron (menstruating women or  patients with a history of low iron):  ? 60 milligrams per day  from [facility] [employer]  Protein drinks (protein drinks should be under  3 grams of sugar and 3 grams of fat). Protein shake (30 to 40 grams per day):  ? a.m. ? p.m. Exercise: ? 30 to 40 minutes per  Educated on Diet Progression    [facility] G[facility] Bypass and Sleeve Dietary Progression    Patient Name:   Date of Surgery: Ice Chips start once admitted on floor. Begin Bariatric Clear Liquid Diet on: may 1    Clear Liquid Diet: 64 oz. of fluid per day  o Low calorie, low sugar, non-carbonated beverages  - Water, Crystal Light, Propel Water, Sugar Free Jell-O, Sugar Free Popsicles, Bouillon  - Start protein supplement during this stage. (60-70 grams per day)  - Getting your fluid in and staying hydrated is your #1 priority! - The clear liquid diet will last for 7 days. Begin Bariatric Soft and Moist on: May 8th  - This stage of the diet will last for 5 weeks, unless otherwise instructed by your surgeon.   - Begin:  1 week post-op   - End:  6 weeks post-op (or when you follow up with the Registered Dietitian)    - Soft, moist, high protein foods: 3 meals per day plus protein supplements. o   Portions should emphasize on soft protein. o Portions will be a MAXIMUM of:  o  1 ounce of solid food  o  2-3 ounces of cottage cheese and yogurt. o Protein supplements should be between meals and provide 30-40 grams per day during soft protein diet. o Continue to get 64 ounces of fluid in per day. - Protein foods that are ok on the Soft and Moist Diet include:  o Slow transition:  o 1st week on soft protein should focus on: Yogurt, cottage cheese, eggs  o 2nd -4th  week on soft protein diet should focus on: yogurt, cottage cheese, eggs, canned tuna, canned chicken, tilapia, fish (needs to be soft enough to be cut up with a fork)  o 5th week on soft protein diet should focus on: Yogurt, cottage cheese, eggs, canned tuna, canned chicken, tilapia, fish, salmon, chicken breast, or turkey. Remember to continue to get 64 ounces of fluid daily on ALL Stages. To be advanced to Bariatric Maintenance Stage of the bariatric diet, follow up with the dietitian 6 weeks post-op, around:         For any additional questions, please refer to your blue binder that was provided to you at the start of the bariatric program.

## 2019-05-06 NOTE — DISCHARGE SUMMARY
Bariatric Surgery Discharge Progress Note    Admission Date: 4/30/2019    Discharge Date: 5/1/2019      Admission Diagnosis:    Clinically severe Obesity    Comorbidities:  Hypercholesterolemia, hypertension, obstructive sleep apnea    Discharge Diagnosis:     Clinically Severe Obesity, s/p laparoscopic Baljinder-en-Y divided gastric bypass with comorbidities as listed above    Procedures:   Laparoscopic Baljinder-en-Y divided gastric bypass    Postop Complications: None    Hospital Course:  Patient was admitted on 4/30/2019 for scheduled bariatric surgery. Operation was without significant complication. Patient admitted to the floor postoperatively, monitored as per protocol. Diet sequentially advanced beginning POD 1, pain medications transitioned to oral during the hospital course. At the time of discharge, the patient is afebrile, vital signs stable, tolerating a clear liquid diet with protein supplementation, voiding spontaneously, ambulatory with adequate pain control with oral medications and clear surgical sites without evidence of infection.     Discharge Diet:  Clear Liquid Bariatric Diet for 7 days, then soft moist protein diet for 5 weeks    Discharge Medications:   *All medications as per Medical Reconciliation Form\"    Bariatric Chewable vitamins, 2 orally daily for life  Calcium Citrate 2000mg orally daily for life  Vitamin B12 1000micrograms sublingual daily for life  Oxycodone 5mg tab 1-2 by mouth every 4-6 hours as needed for pain uncontrolled with Tylenol  Enoxaparin (Lovenox) 40mg sub-Q daily for 7 days    Discharge disposition: home    Local wound care with daily showers, keep wounds clean and dry    Activity: as desired, no lifting greater than 15lbs or situps for 30 days    Special Instructions:   No driving until activity is not influenced by incisional pain and off narcotics   No bath or hot tub until wounds are healed   Pulse and temperature twice daily for 10 days   Notify [facility] for a Temp >100.5 or Pulse>115    Followup with surgeon in 2 weeks    Adriana Sharma MS, PACharlieC

## 2019-05-07 ENCOUNTER — TELEPHONE (OUTPATIENT)
Dept: MEDSURG UNIT | Age: 49
End: 2019-05-07

## 2019-05-07 NOTE — TELEPHONE ENCOUNTER
Post Op Follow Up Call- Bariatric Surgery  Date of Surgery          Type of Surgery    Pain o  V.S HR 61 T 98.3  Lovenox yes  Water 70 ounces  Protein 60 grams  Other  Walking 20 minutes  BM yes  Nausea n  Vomiting n  Vitamins yes  Recommendations  Instructed to call office if not getting enough fluids  Instructed to call office for ant problems before visiting E.R    Follow up appointment  Surgeon yes  Dietitian yes

## 2019-05-16 ENCOUNTER — OFFICE VISIT (OUTPATIENT)
Dept: SURGERY | Age: 49
End: 2019-05-16

## 2019-05-16 VITALS
HEART RATE: 66 BPM | HEIGHT: 72 IN | RESPIRATION RATE: 18 BRPM | TEMPERATURE: 97.3 F | BODY MASS INDEX: 41.04 KG/M2 | OXYGEN SATURATION: 96 % | DIASTOLIC BLOOD PRESSURE: 74 MMHG | SYSTOLIC BLOOD PRESSURE: 122 MMHG | WEIGHT: 303 LBS

## 2019-05-16 DIAGNOSIS — K91.2 POSTOPERATIVE MALABSORPTION: Primary | ICD-10-CM

## 2019-05-16 RX ORDER — LANOLIN ALCOHOL/MO/W.PET/CERES
1000 CREAM (GRAM) TOPICAL EVERY OTHER DAY
COMMUNITY

## 2019-05-16 RX ORDER — GLUCOSAMINE SULFATE 1500 MG
5000 POWDER IN PACKET (EA) ORAL DAILY
COMMUNITY

## 2019-05-16 RX ORDER — LANOLIN ALCOHOL/MO/W.PET/CERES
CREAM (GRAM) TOPICAL DAILY
COMMUNITY

## 2019-05-16 NOTE — PROGRESS NOTES
Chief Complaint   Patient presents with    Post OP Follow Up     Laparoscopic Baljinder-en-Y gastric bypass 4/30/19   1. Have you been to the ER, urgent care clinic since your last visit? Hospitalized since your last visit? No    2. Have you seen or consulted any other health care providers outside of the [address] [place] [address] since your last visit? Include any pap smears or colon screening. No                     Eating and drinking without difficulty taking all supplements   Pt ID confirmed    Weight Loss Metrics 5/16/2019 5/16/2019 4/30/2019 4/29/2019 4/11/2019 4/11/2019 6/13/2018   Pre op / Initial Wt 336 - - - 336 - 355   Today's Wt - 303 lb - 335 lb - 336 lb -   BMI - 41.09 kg/m2 45.43 kg/m2 - - 45.57 kg/m2 -   Ideal Body Wt 164 - - - 164 - 164   Excess Body Wt 172 - - - 172 - 191   Goal Wt 198 - - - 198 - 202   Wt loss to date 33 - - - 0 - 0   % Wt Loss 0.24 - - - 0 - 0   80% .6 - - - 137.6 - 152.8       Body mass index is 41.09 kg/m².     Post op medications:  MVI: 2x  Calcium Citrate: 1500 milligrams  Actigal 0  B-12 1000 micrograms  Vit D 3 5000 units   Vit b1 one hundred milligrams daily

## 2019-05-16 NOTE — LETTER
5/16/19 Patient: Anne Christianson YOB: 1970 Date of Visit: 5/16/2019 Shyanne Martini MD 
[id number] [facility] [address] [place] [id number] VIA Facsimile: [phone] Dear Shyanne Martini MD, Thank you for referring Mr. Anne Christianson to [place] [id number] for evaluation. My notes for this consultation are attached. If you have questions, please do not hesitate to call me. I look forward to following your patient along with you.  
 
 
Sincerely, 
 
Aubrey Danielle MD

## 2019-05-16 NOTE — PROGRESS NOTES
Chief Complaint Patient presents with  Post OP Follow Up Laparoscopic Baljinder-en-Y gastric bypass 4/30/19  
getting over 64 ounces fluids in plus protein supplement and tolerating diet 1. Have you been to the ER, urgent care clinic since your last visit? Hospitalized since your last visit? No 
 
2. Have you seen or consulted any other health care providers outside of the [address] [place] [address] since your last visit? Include any pap smears or colon screening. No        
Pt ID confirmed Weight Loss Metrics 5/16/2019 4/30/2019 4/29/2019 4/11/2019 4/11/2019 6/13/2018 6/13/2018 Pre op / Initial Wt - - - 336 - 355 - Today's Wt 303 lb - 335 lb - 336 lb - 355 lb BMI 41.09 kg/m2 45.43 kg/m2 - - 45.57 kg/m2 - 48.15 kg/m2 Ideal Body Wt - - - 164 - 164 - Excess Body Wt - - - 172 - 191 -  
Goal Wt - - - 198 - 202 - Wt loss to date - - - 0 - 0 -  
% Wt Loss - - - 0 - 0 -  
80% EBW - - - 137.6 - 152.8 - Body mass index is 41.09 kg/m². Post op medications: MVI: 2x 
Calcium Citrate: 1500 milligrams Actigal 0 
B-12 1000micrograms Vit D 3 5000 units

## 2019-05-16 NOTE — PROGRESS NOTES
Subjective:      Pam Archer is a 50 y.o. male is now 2 weeks status post laparoscopic gastric bypass surgery. Doing well overall. Currently on a stage 3 diet without difficulty. Taking in 80oz water,  60g protein. 40min of activity daily. Bowel movements are regular. The patient is not having any pain. .  The patient is compliant with multivitamins, calcium and B12 supplements. Weight Loss Metrics 5/16/2019 5/16/2019 4/30/2019 4/29/2019 4/11/2019 4/11/2019 6/13/2018   Pre op / Initial Wt 336 - - - 336 - 355   Today's Wt - 303 lb - 335 lb - 336 lb -   BMI - 41.09 kg/m2 45.43 kg/m2 - - 45.57 kg/m2 -   Ideal Body Wt 164 - - - 164 - 164   Excess Body Wt 172 - - - 172 - 191   Goal Wt 198 - - - 198 - 202   Wt loss to date 33 - - - 0 - 0   % Wt Loss 0.24 - - - 0 - 0   80% .6 - - - 137.6 - 152.8       Body mass index is 41.09 kg/m². Comorbidities:    Hypertension: improved  Diabetes: not applicable  Obstructive Sleep Apnea: improved  Hyperlipidemia: improved  Stress Urinary Incontinence: not applicable  Gastroesophageal Reflux: not applicable  Weight related arthropathy:not applicable        Past Medical History:   Diagnosis Date    Arthritis     Hypercholesterolemia     Hypertension     Sleep apnea     cpap       Past Surgical History:   Procedure Laterality Date    HX GI      gastric bypass    HX KNEE ARTHROSCOPY Left     torn meniscus    HX OTHER SURGICAL      wisdom teeth and implanted tooth       Current Outpatient Medications   Medication Sig Dispense Refill    multivitamin with iron (FLINTSTONES) chewable tablet Take 1 Tab by mouth two (2) times a day.  OTHER Calcium citrate liquid 500 milligrams 3xday      cyanocobalamin (VITAMIN B-12) 1,000 mcg tablet Take 1,000 mcg by mouth daily.  cholecalciferol (VITAMIN D3) 1,000 unit cap Take 5,000 Units by mouth daily.  thiamine HCL (VITAMIN B-1) 100 mg tablet Take  by mouth daily.       lisinopril (PRINIVIL, ZESTRIL) 10 mg tablet take 1 tablet by mouth once daily  0    simvastatin (ZOCOR) 20 mg tablet   0       No Known Allergies      Objective:     Visit Vitals  /74   Pulse 66   Temp 97.3 °F (36.3 °C)   Resp 18   Ht 6' (1.829 m)   Wt 137.4 kg (303 lb)   SpO2 96%   BMI 41.09 kg/m²       General:  alert, cooperative, no distress, appears stated age   Chest: no accessory muscle use   Cor:   Regular rate and rhythm or S1S2 present   Abdomen: soft, bowel sounds active, non-tender   Incision:   healing well, no drainage, no erythema, no hernia, no seroma, no swelling, no dehiscence, incision well approximated       Labs: none    Assessment:     Doing well postoperatively.     Plan:     Follow up labs as ordered, Increase fluids, Follow up with Registered Dietician and Continue MVI/Ca/B12 supplementation  Follow up in 3 months

## 2019-06-06 ENCOUNTER — DOCUMENTATION ONLY (OUTPATIENT)
Dept: BARIATRICS/WEIGHT MGMT | Age: 49
End: 2019-06-06

## 2019-06-06 ENCOUNTER — HOSPITAL ENCOUNTER (OUTPATIENT)
Dept: BARIATRICS/WEIGHT MGMT | Age: 49
Discharge: HOME | End: 2019-06-06

## 2019-06-06 NOTE — PROGRESS NOTES
[facility]  POST-OP NUTRITION FOLLOW UP    Patient's Name: Diane Liang  YOB: 1970  Surgery Date: 19      Procedure: Gastric Bypass    Surgeon: Dr. Bridget Hua    Height: 6 f      Pre-Op Weight: 336     Current Weight: 288  Weight Lost: 48    BMI:  39.1    Attendance of support group:   When:   Why not:     Complications  Readmittance: None  Reoperations: None  Complications: None  IV Fluids: None  ER Trips: None    Problem Areas:   Nausea: None   Vomiting: None   Dumping Syndrome: None  Inadequate Protein: None. Patient is getting at least one protein shake per day. Inadequate Fluids: None. Patient is getting at least 64 ounces of fluid. Food Intolerance: None  Hunger: More if meal time is approaching  Constipation: None    Eating 3 Meals/Day: Yes   Portion Size at Meals: 1-2 ounces     Protein from Food:  Not always    Foods being consumed:  Breakfast: Time:8-9 am:  Small serving of eggs  Lunch: Time: 11-1 pm:   Eggs, chicken canned or canned tuna. Refried beans  Dinner:  Time: 5:30-7:00 pm:   Same as above  In-between eating: None. Protein shake    Length of time for meals: 20-25 minutes    food/fluids: 30    Fluids: 64+ oz/day   Types of Fluids: water, protein shake    MVI: Flintstones    Number/Day: bid   Taken Separately: yes    Vitamin B1: Yes  Dosin mg    Calcium: Liquid    Calcium Dosing: tid    Taken Separately: yes    Vitamin B12: sublingual   Vitamin B12 Dosin mcg    Vitamin D:      Vitamin D dosin iu    Iron: n/a    Iron dosing:      Protein Supplement: Premier     Grams of Protein: 30-60   Mixed with:      Splitting Protein Drink in 1/2:    Timing of Protein Drinks:   Patient is taking 7 days a week. Exercise: Walking: Patient is walking 20-30 minutes per day. Comments:    Patient is doing well at 6 weeks post op. His portions are appropriate. He is compliant with vitamins, fluid, and protein shakes.   He is walking daily. Patient was educated on the importance of eating meat and vegetables only. I have talked with patient about the effects of carbohydrates, not only from a weight management perspective, but also what effects it could have on their blood sugar and what reactive hypoglycemia is.         Diet Follow Up:  9 month class scheduled for: February 7 at 9:30 am    Briseyda Foote [address] RD    6/6/2019

## 2019-07-27 ENCOUNTER — HOSPITAL ENCOUNTER (OUTPATIENT)
Dept: LAB | Age: 49
Discharge: HOME | End: 2019-07-27
Payer: COMMERCIAL

## 2019-07-27 DIAGNOSIS — K91.2 POSTOPERATIVE MALABSORPTION: ICD-10-CM

## 2019-07-27 LAB
25(OH)D3 SERPL-MCNC: 65.9 NG/ML (ref 30–100)
ALBUMIN SERPL-MCNC: 3.5 G/DL (ref 3.4–5)
ANION GAP SERPL CALC-SCNC: 5 MMOL/L (ref 3–18)
BASOPHILS # BLD: 0 K/UL (ref 0–0.1)
BASOPHILS NFR BLD: 0 % (ref 0–2)
BUN SERPL-MCNC: 11 MG/DL (ref 7–18)
BUN/CREAT SERPL: 19 (ref 12–20)
CALCIUM SERPL-MCNC: 8.8 MG/DL (ref 8.5–10.1)
CHLORIDE SERPL-SCNC: 107 MMOL/L (ref 100–111)
CO2 SERPL-SCNC: 31 MMOL/L (ref 21–32)
CREAT SERPL-MCNC: 0.57 MG/DL (ref 0.6–1.3)
DIFFERENTIAL METHOD BLD: ABNORMAL
EOSINOPHIL # BLD: 0.2 K/UL (ref 0–0.4)
EOSINOPHIL NFR BLD: 3 % (ref 0–5)
ERYTHROCYTE [DISTWIDTH] IN BLOOD BY AUTOMATED COUNT: 14.4 % (ref 11.6–14.5)
FERRITIN SERPL-MCNC: 305 NG/ML (ref 8–388)
FOLATE SERPL-MCNC: 15.3 NG/ML (ref 3.1–17.5)
GLUCOSE SERPL-MCNC: 103 MG/DL (ref 74–99)
HCT VFR BLD AUTO: 36.7 % (ref 36–48)
HGB BLD-MCNC: 12.4 G/DL (ref 13–16)
IRON SERPL-MCNC: 58 UG/DL (ref 50–175)
LYMPHOCYTES # BLD: 1.4 K/UL (ref 0.9–3.6)
LYMPHOCYTES NFR BLD: 22 % (ref 21–52)
MCH RBC QN AUTO: 29.7 PG (ref 24–34)
MCHC RBC AUTO-ENTMCNC: 33.8 G/DL (ref 31–37)
MCV RBC AUTO: 87.8 FL (ref 74–97)
MONOCYTES # BLD: 0.4 K/UL (ref 0.05–1.2)
MONOCYTES NFR BLD: 6 % (ref 3–10)
NEUTS SEG # BLD: 4.4 K/UL (ref 1.8–8)
NEUTS SEG NFR BLD: 69 % (ref 40–73)
PLATELET # BLD AUTO: 228 K/UL (ref 135–420)
PMV BLD AUTO: 11.8 FL (ref 9.2–11.8)
POTASSIUM SERPL-SCNC: 3.8 MMOL/L (ref 3.5–5.5)
RBC # BLD AUTO: 4.18 M/UL (ref 4.7–5.5)
SODIUM SERPL-SCNC: 143 MMOL/L (ref 136–145)
VIT B12 SERPL-MCNC: 1023 PG/ML (ref 211–911)
WBC # BLD AUTO: 6.4 K/UL (ref 4.6–13.2)

## 2019-07-27 PROCEDURE — 83540 ASSAY OF IRON: CPT

## 2019-07-27 PROCEDURE — 80048 BASIC METABOLIC PNL TOTAL CA: CPT

## 2019-07-27 PROCEDURE — 82728 ASSAY OF FERRITIN: CPT

## 2019-07-27 PROCEDURE — 84425 ASSAY OF VITAMIN B-1: CPT

## 2019-07-27 PROCEDURE — 82607 VITAMIN B-12: CPT

## 2019-07-27 PROCEDURE — 82306 VITAMIN D 25 HYDROXY: CPT

## 2019-07-27 PROCEDURE — 82040 ASSAY OF SERUM ALBUMIN: CPT

## 2019-07-27 PROCEDURE — 85025 COMPLETE CBC W/AUTO DIFF WBC: CPT

## 2019-07-27 PROCEDURE — 36415 COLL VENOUS BLD VENIPUNCTURE: CPT

## 2019-07-31 LAB — VIT B1 BLD-SCNC: 164.4 NMOL/L (ref 66.5–200)

## 2019-08-12 ENCOUNTER — OFFICE VISIT (OUTPATIENT)
Dept: SURGERY | Age: 49
End: 2019-08-12

## 2019-08-12 VITALS
DIASTOLIC BLOOD PRESSURE: 62 MMHG | OXYGEN SATURATION: 95 % | RESPIRATION RATE: 18 BRPM | BODY MASS INDEX: 34.73 KG/M2 | WEIGHT: 256.4 LBS | SYSTOLIC BLOOD PRESSURE: 106 MMHG | HEART RATE: 55 BPM | HEIGHT: 72 IN | TEMPERATURE: 98.1 F

## 2019-08-12 DIAGNOSIS — I10 ESSENTIAL HYPERTENSION: ICD-10-CM

## 2019-08-12 DIAGNOSIS — Z99.89 OSA ON CPAP: ICD-10-CM

## 2019-08-12 DIAGNOSIS — E66.01 MORBID OBESITY WITH BMI OF 45.0-49.9, ADULT (HCC): ICD-10-CM

## 2019-08-12 DIAGNOSIS — K91.2 POSTOPERATIVE MALABSORPTION: Primary | ICD-10-CM

## 2019-08-12 DIAGNOSIS — G47.33 OSA ON CPAP: ICD-10-CM

## 2019-08-12 DIAGNOSIS — E78.2 MIXED HYPERLIPIDEMIA: ICD-10-CM

## 2019-08-12 NOTE — PROGRESS NOTES
Bariatric Follow Up Note    Yvrose Milligan is a 52 y.o. male is now 3 months status post laparoscopic Baljinder-en-Y divided gastric bypass surgery. Doing well overall. Currently on a regular bariatric diet without difficulty. Taking in 70-100oz fluid,  40-50g protein. Walking 15-30min of activity 5-7 days a week. The patient denies hair loss. Bowel movements are regular. The patient is not having any pain. He seems to be compliant with multivitamins, Protein, calcium, Vit D and B12 supplements. The patient reports no difficulty eating/drinking. The patient denies smoking, etOH use, NSAID use and carbonation ingestion. Weight Loss Metrics 8/12/2019 8/12/2019 5/16/2019 5/16/2019 4/30/2019 4/29/2019 4/11/2019   Pre op / Initial Wt 336 - 336 - - - 336   Today's Wt - 256 lb 6.4 oz - 303 lb - 335 lb -   BMI - 34.77 kg/m2 - 41.09 kg/m2 45.43 kg/m2 - -   Ideal Body Wt 163.5 - 164 - - - 164   Excess Body Wt 172.5 - 172 - - - 172   Goal Wt - - 198 - - - 198   Wt loss to date 79.6 - 33 - - - 0   % Wt Loss 0.58 - 0.24 - - - 0   80%  - 137.6 - - - 137.6       Body mass index is 34.77 kg/m². Comorbidities:     Hypertension: improved  Diabetes: not applicable  Obstructive Sleep Apnea: improved  Hyperlipidemia: improved  Stress Urinary Incontinence: not applicable  Gastroesophageal Reflux: not applicable  Weight related arthropathy:not applicable        Past Medical History:   Diagnosis Date    Arthritis     Hypercholesterolemia     Hypertension     Sleep apnea     cpap       Past Surgical History:   Procedure Laterality Date    HX GI  04/30/2019    gastric bypass    HX KNEE ARTHROSCOPY Left     torn meniscus    HX OTHER SURGICAL      wisdom teeth and implanted tooth       Current Outpatient Medications   Medication Sig Dispense Refill    multivitamin with iron (FLINTSTONES) chewable tablet Take 1 Tab by mouth two (2) times a day.       OTHER Calcium citrate liquid 500 milligrams 3xday      cyanocobalamin (VITAMIN B-12) 1,000 mcg tablet Take 1,000 mcg by mouth daily.  cholecalciferol (VITAMIN D3) 1,000 unit cap Take 5,000 Units by mouth daily.  thiamine HCL (VITAMIN B-1) 100 mg tablet Take  by mouth daily.  lisinopril (PRINIVIL, ZESTRIL) 10 mg tablet take 1 tablet by mouth once daily  0    simvastatin (ZOCOR) 20 mg tablet   0       No Known Allergies    ROS:  Review of Systems:  Positives in bold    Constitutional:Unexpected weight gain/loss, night sweats,fatigue/maliase/lethargy, change in sleep, fever, rash  Eyes:  Visual changes, headaches, eye pain, floaters  ENT:  Rhinorrhea, epistaxis, change in hearing, gingival bleeding, sore throat, dysphagia  CV:  Denies chest pain, shortness of breath, difficulty breathing, orthopnea, palpitations, loss of consciousness, claudication  Resp: Cough, wheeze, haemoptysis, sob, exercise intolerence  GI:  Abdominal pain, dysphagia, reflux, bloating, cramping. Obstipation, haematemesis, brbpr, hematochezia,dark tarry stools. Nausea, vomitting, diarrhea, constipation. Neuro: Paresthesias, numbness, weakness, changes in balance, changes in speech, loss of control of bowel or bladder,   Psych: Changes in depression, anxiety, sleep patterns, change in energy Levels    Remainder of ROS as per HPI. Physicial Exam:  Visit Vitals  /62   Pulse (!) 55   Temp 98.1 °F (36.7 °C) (Oral)   Resp 18   Ht 6' (1.829 m)   Wt 116.3 kg (256 lb 6.4 oz)   SpO2 95%   BMI 34.77 kg/m²         General: AAOX3, pleasant and cooperative to exam. Appropriately groomed. NAD. Non-toxic in appearance. Appears stated age. HENT: NC/AT. PERRLA. Extraocular motions are intact. Sclera anicteric, Conjunctiva Clear. Nares clear. Oropharynx pink, moist without exudate or erythema. Uvula Midline. Neck:  Supple, trachea is midline. No JVD, Lymphadenopathy. No bruits.   Chest: Good equal bilateral expansion  Lungs: Clear to auscultation bilaterally without e/o crackles, wheezes or rhales. Heart: RRR, S1 and S2 noted. No c/r/m/g/vpmi. Abdomen: obese, soft and non-tender without distension. Good bowel sounds. No vis/palp masses or pulsations. No organo-splenomegaly. No hernias to my exam. No e/o acute abdomen or peritoneal signs. Previous surgical wounds well-healed. Pelvis: Stable. :  Deferred  Rectal: Deferred  Extremities: Positive pulses in all 4 extremities. Baseline range of motion in all 4 extremities. Strength, sensation and reflexes intact, appropriate and equal in b/l upper and lower extremities. No C/C/E  Neuro: CN II-XII grossly intact without focal deficit. Ambulatory. Skin: Clean, warm and dry. Labs: Reviewed with patient. Grossly within normal limits. Assessment/Plan: Pt is currently 3 months s/p laparoscopic Baljinder-en-Y divided gastric bypass surgery with a total weight loss of 80 lbs to date, doing well overall  Stressed importance of hydration with SF clear liquids until urine clear. OK to resume bariatric diet, with food content mainly meats/veggies. Encouraged support group attendance, recommended dietician visit with food diary. Advised exercise program of 20-30 minutes daily 5-7 times per week. Recommended utilizing bariatric multivitamins or at least adult chewable multivitamins in lieu of flintstones and/or flintstones gummies. Follow up 3 months with labs, sooner as needed. Health Maintenance issues reviewed and except as it relates to bariatrics, deferred to primary care. Greater than 50% of this 30 minute visit was spent couseling the patient about the aforementioned issues.          Saqib Urias MS, PACharlieC

## 2019-08-12 NOTE — PROGRESS NOTES
Chief Complaint   Patient presents with    Follow-up     GBP 4/30/2019. Pt ID confirmed    Weight Loss Metrics 8/12/2019 5/16/2019 5/16/2019 4/30/2019 4/29/2019 4/11/2019 4/11/2019   Pre op / Initial Wt - 336 - - - 336 -   Today's Wt 256 lb 6.4 oz - 303 lb - 335 lb - 336 lb   BMI 34.77 kg/m2 - 41.09 kg/m2 45.43 kg/m2 - - 45.57 kg/m2   Ideal Body Wt - 164 - - - 164 -   Excess Body Wt - 172 - - - 172 -   Goal Wt - 198 - - - 198 -   Wt loss to date - 33 - - - 0 -   % Wt Loss - 0.24 - - - 0 -   80% EBW - 137.6 - - - 137.6 -       Body mass index is 34.77 kg/m².     Post op medications:  MVI: flintstones twice solares  Calcium Citrate: 1500 milligrams  B-1 100 milligrams  B-12 1000 micrograms  Vit D 3 5000 units

## 2019-11-02 ENCOUNTER — HOSPITAL ENCOUNTER (OUTPATIENT)
Dept: LAB | Age: 49
Discharge: HOME | End: 2019-11-02
Payer: COMMERCIAL

## 2019-11-02 DIAGNOSIS — Z99.89 OSA ON CPAP: ICD-10-CM

## 2019-11-02 DIAGNOSIS — G47.33 OSA ON CPAP: ICD-10-CM

## 2019-11-02 DIAGNOSIS — K91.2 POSTOPERATIVE MALABSORPTION: ICD-10-CM

## 2019-11-02 DIAGNOSIS — E78.2 MIXED HYPERLIPIDEMIA: ICD-10-CM

## 2019-11-02 DIAGNOSIS — E66.01 MORBID OBESITY WITH BMI OF 45.0-49.9, ADULT (HCC): ICD-10-CM

## 2019-11-02 DIAGNOSIS — I10 ESSENTIAL HYPERTENSION: ICD-10-CM

## 2019-11-02 LAB
25(OH)D3 SERPL-MCNC: 72.6 NG/ML (ref 30–100)
ALBUMIN SERPL-MCNC: 3.8 G/DL (ref 3.4–5)
ANION GAP SERPL CALC-SCNC: 4 MMOL/L (ref 3–18)
BASOPHILS # BLD: 0 K/UL (ref 0–0.1)
BASOPHILS NFR BLD: 0 % (ref 0–2)
BUN SERPL-MCNC: 11 MG/DL (ref 7–18)
BUN/CREAT SERPL: 15 (ref 12–20)
CALCIUM SERPL-MCNC: 9.1 MG/DL (ref 8.5–10.1)
CHLORIDE SERPL-SCNC: 104 MMOL/L (ref 100–111)
CO2 SERPL-SCNC: 32 MMOL/L (ref 21–32)
CREAT SERPL-MCNC: 0.75 MG/DL (ref 0.6–1.3)
DIFFERENTIAL METHOD BLD: NORMAL
EOSINOPHIL # BLD: 0.1 K/UL (ref 0–0.4)
EOSINOPHIL NFR BLD: 2 % (ref 0–5)
ERYTHROCYTE [DISTWIDTH] IN BLOOD BY AUTOMATED COUNT: 13.2 % (ref 11.6–14.5)
FERRITIN SERPL-MCNC: 148 NG/ML (ref 8–388)
FOLATE SERPL-MCNC: >20 NG/ML (ref 3.1–17.5)
GLUCOSE SERPL-MCNC: 90 MG/DL (ref 74–99)
HCT VFR BLD AUTO: 44.1 % (ref 36–48)
HGB BLD-MCNC: 15.2 G/DL (ref 13–16)
IRON SERPL-MCNC: 55 UG/DL (ref 50–175)
LYMPHOCYTES # BLD: 1.3 K/UL (ref 0.9–3.6)
LYMPHOCYTES NFR BLD: 25 % (ref 21–52)
MCH RBC QN AUTO: 30.5 PG (ref 24–34)
MCHC RBC AUTO-ENTMCNC: 34.5 G/DL (ref 31–37)
MCV RBC AUTO: 88.6 FL (ref 74–97)
MONOCYTES # BLD: 0.4 K/UL (ref 0.05–1.2)
MONOCYTES NFR BLD: 8 % (ref 3–10)
NEUTS SEG # BLD: 3.4 K/UL (ref 1.8–8)
NEUTS SEG NFR BLD: 65 % (ref 40–73)
PLATELET # BLD AUTO: 236 K/UL (ref 135–420)
PMV BLD AUTO: 11.2 FL (ref 9.2–11.8)
POTASSIUM SERPL-SCNC: 4 MMOL/L (ref 3.5–5.5)
RBC # BLD AUTO: 4.98 M/UL (ref 4.7–5.5)
SODIUM SERPL-SCNC: 140 MMOL/L (ref 136–145)
TSH SERPL DL<=0.05 MIU/L-ACNC: 1.39 UIU/ML (ref 0.36–3.74)
VIT B12 SERPL-MCNC: 1667 PG/ML (ref 211–911)
WBC # BLD AUTO: 5.2 K/UL (ref 4.6–13.2)

## 2019-11-02 PROCEDURE — 83540 ASSAY OF IRON: CPT

## 2019-11-02 PROCEDURE — 84443 ASSAY THYROID STIM HORMONE: CPT

## 2019-11-02 PROCEDURE — 84425 ASSAY OF VITAMIN B-1: CPT

## 2019-11-02 PROCEDURE — 82728 ASSAY OF FERRITIN: CPT

## 2019-11-02 PROCEDURE — 82040 ASSAY OF SERUM ALBUMIN: CPT

## 2019-11-02 PROCEDURE — 36415 COLL VENOUS BLD VENIPUNCTURE: CPT

## 2019-11-02 PROCEDURE — 82607 VITAMIN B-12: CPT

## 2019-11-02 PROCEDURE — 85025 COMPLETE CBC W/AUTO DIFF WBC: CPT

## 2019-11-02 PROCEDURE — 80048 BASIC METABOLIC PNL TOTAL CA: CPT

## 2019-11-02 PROCEDURE — 82306 VITAMIN D 25 HYDROXY: CPT

## 2019-11-04 LAB — VIT B1 BLD-SCNC: 214.2 NMOL/L (ref 66.5–200)

## 2019-11-04 NOTE — PATIENT INSTRUCTIONS
Patient Education        Chest Pain: Care Instructions  Your Care Instructions    There are many things that can cause chest pain. Some are not serious and will get better on their own in a few days. But some kinds of chest pain need more testing and treatment. Your doctor may have recommended a follow-up visit in the next 8 to 12 hours. If you are not getting better, you may need more tests or treatment. Even though your doctor has released you, you still need to watch for any problems. The doctor carefully checked you, but sometimes problems can develop later. If you have new symptoms or if your symptoms do not get better, get medical care right away. If you have worse or different chest pain or pressure that lasts more than 5 minutes or you passed out (lost consciousness), call 911 or seek other emergency help right away. A medical visit is only one step in your treatment. Even if you feel better, you still need to do what your doctor recommends, such as going to all suggested follow-up appointments and taking medicines exactly as directed. This will help you recover and help prevent future problems. How can you care for yourself at home? · Rest until you feel better. · Take your medicine exactly as prescribed. Call your doctor if you think you are having a problem with your medicine. · Do not drive after taking a prescription pain medicine. When should you call for help? Call 911 if:    · You passed out (lost consciousness).     · You have severe difficulty breathing.     · You have symptoms of a heart attack. These may include:  ? Chest pain or pressure, or a strange feeling in your chest.  ? Sweating. ? Shortness of breath. ? Nausea or vomiting. ? Pain, pressure, or a strange feeling in your back, neck, jaw, or upper belly or in one or both shoulders or arms. ? Lightheadedness or sudden weakness. ? A fast or irregular heartbeat.   After you call 911, the  may tell you to chew 1 Supplement Resource Guide    Protein Supplement (Recommended up to 6 months post-op)   60-70 Grams of Protein  (during clear liquid phase)   30-50 Grams of Protein  (during soft protein phase and beyond)   0-3 g fat per serving/ 0-3 g sugar per serving   Avoid mixing with milk, fruit, peanut butter, etc.   (Powder should be made with water or Crystal Light)  Calcium Supplement (Lifetime)  Look for: Calcium Citrate (1500 mg per day)   The body cannot absorb more than 500-600 mg at once and needs to be taken in 3 separate dosages. Recommend:   Citracal- 630mg (2 caplets) three times each day   Upcal D- 3 packages or scoops/day. (Each package taken separately)  o www.colonialmedical.com (powdered calcium)   Liquid- 3 Tbsp. per day. (Each Tbsp. taken separately)   Chewable- Must be Calcium citrate  o www.[employer]  o www.[employer]  Vitamin D (Lifetime)                           Look for: Vitamin D3 (Cholecalciferol)   5,000 IU of Vitamin D3 per day   This is in ADDITION to the Vitamin D in your Calcium and Multivitamin    Multi-Vitamin Supplement (Lifetime)  Take 2 vitamins separately each day   Flintstones Complete or a generic chewable complete multivitamin   Bariatric Advantage Multivitamin   Vitamin B1   (Lifetime)   100 mg B1 needed per day (if taking Flintstones Complete or a generic complete chewable).  Additional B1 is NOT needed if taking Bariatric Advantage Multivitamin (Bariatric Advantage has adequate B1 in it). Vitamin B12 (Lifetime)   1000 mcg DAILY of Vitamin B12   All Vitamin B12 must be taken sublingually (under your tongue)    Iron  *Required for menstruating women OR all patients that have a history of low iron*   Recommended to take 500 mg of Vitamin C chewable 30 minutes prior to taking the iron to help with absorption   Avoid taking with calcium supplements.  (Calcium inhibits the absorption of iron)   We recommend going to Bariatric Advantages Website adult-strength or 2 to 4 low-dose aspirin. Wait for an ambulance. Do not try to drive yourself.    Call your doctor today if:    · You have any trouble breathing.     · Your chest pain gets worse.     · You are dizzy or lightheaded, or you feel like you may faint.     · You are not getting better as expected.     · You are having new or different chest pain. Where can you learn more? Go to http://sarahi-musa.info/. Enter A120 in the search box to learn more about \"Chest Pain: Care Instructions. \"  Current as of: June 26, 2019  Content Version: 12.2  © 4319-3783 [employer]. Care instructions adapted under license by [employer] (which disclaims liability or warranty for this information). If you have questions about a medical condition or this instruction, always ask your healthcare professional. [address] any warranty or liability for your use of this information. and getting their iron. (www.bariatricadvantage. com) (The lemon-lime has 60mg of iron)    OTC iron is a dosage of 65 mg

## 2019-11-25 ENCOUNTER — OFFICE VISIT (OUTPATIENT)
Dept: SURGERY | Age: 49
End: 2019-11-25

## 2019-11-25 VITALS
DIASTOLIC BLOOD PRESSURE: 68 MMHG | BODY MASS INDEX: 32.45 KG/M2 | SYSTOLIC BLOOD PRESSURE: 128 MMHG | RESPIRATION RATE: 18 BRPM | WEIGHT: 239.6 LBS | TEMPERATURE: 98.6 F | HEIGHT: 72 IN | OXYGEN SATURATION: 97 % | HEART RATE: 54 BPM

## 2019-11-25 DIAGNOSIS — Z98.84 HISTORY OF ROUX-EN-Y GASTRIC BYPASS: ICD-10-CM

## 2019-11-25 DIAGNOSIS — Z99.89 OSA ON CPAP: ICD-10-CM

## 2019-11-25 DIAGNOSIS — E78.00 HYPERCHOLESTEREMIA: ICD-10-CM

## 2019-11-25 DIAGNOSIS — K91.2 POSTSURGICAL MALABSORPTION: Primary | ICD-10-CM

## 2019-11-25 DIAGNOSIS — I10 ESSENTIAL HYPERTENSION: ICD-10-CM

## 2019-11-25 DIAGNOSIS — G47.33 OSA ON CPAP: ICD-10-CM

## 2019-11-25 DIAGNOSIS — E66.9 CLASS 1 OBESITY WITHOUT SERIOUS COMORBIDITY WITH BODY MASS INDEX (BMI) OF 32.0 TO 32.9 IN ADULT, UNSPECIFIED OBESITY TYPE: ICD-10-CM

## 2019-11-25 NOTE — PROGRESS NOTES
Chief Complaint   Patient presents with    Follow-up     GBP 4/30/19     Pt ID confirmed    Weight Loss Metrics 11/25/2019 8/12/2019 8/12/2019 5/16/2019 5/16/2019 4/30/2019 4/29/2019   Pre op / Initial Wt - 336 - 336 - - -   Today's Wt 239 lb 9.6 oz - 256 lb 6.4 oz - 303 lb - 335 lb   BMI 32.5 kg/m2 - 34.77 kg/m2 - 41.09 kg/m2 45.43 kg/m2 -   Ideal Body Wt - 163.5 - 164 - - -   Excess Body Wt - 172.5 - 172 - - -   Goal Wt - - - 198 - - -   Wt loss to date - 79.6 - 33 - - -   % Wt Loss - 0.58 - 0.24 - - -   80% EBW - 138 - 137.6 - - -       Body mass index is 32.5 kg/m².     Post op medications:  MVI: flintstones twice daily  Calcium Citrate: 1500 milligrams  B-1 100 milligrams  B-12 1000 micrograms  Vit D 3 5000 units

## 2019-11-25 NOTE — PROGRESS NOTES
Pedro Pettit is a 52 y.o. male is now 6 months status post laparoscopic gastric bypass surgery. Doing well overall. Currently on a regular bariatric diet without difficulty. Taking in > 100 oz water, >70 g protein. 30-60 min of activity 7 days a week. Bowel movements are regular. The patient is not having any pain. [employer] He is compliant with multivitamins, calcium, Vit D and B1 & 12 supplements. Patient denies use of NSAIDs, ETOH, carbonated beverages or use of straws. Patient has met his goal weight and expressed he does not wish to lose too much more weight. Weight Loss Metrics 11/25/2019 11/25/2019 8/12/2019 8/12/2019 5/16/2019 5/16/2019 4/30/2019   Pre op / Initial Wt 336 - 336 - 336 - -   Today's Wt - 239 lb 9.6 oz - 256 lb 6.4 oz - 303 lb -   BMI - 32.5 kg/m2 - 34.77 kg/m2 - 41.09 kg/m2 45.43 kg/m2   Ideal Body Wt 163.5 - 163.5 - 164 - -   Excess Body Wt 172.5 - 172.5 - 172 - -   Goal Wt - - - - 198 - -   Wt loss to date 96.4 - 79.6 - 33 - -   % Wt Loss 0.7 - 0.58 - 0.24 - -   80%  - 138 - 137.6 - -       Body mass index is 32.5 kg/m². Comorbidities:    Hypertension: improved  Diabetes: not applicable  Obstructive Sleep Apnea: improved  Hyperlipidemia: improved  Stress Urinary Incontinence: not applicable  Gastroesophageal Reflux: not applicable  Weight related arthropathy:not applicable        Past Medical History:   Diagnosis Date    Arthritis     Hypercholesterolemia     Hypertension     Sleep apnea     cpap       Past Surgical History:   Procedure Laterality Date    HX GI  04/30/2019    gastric bypass    HX KNEE ARTHROSCOPY Left     torn meniscus    HX OTHER SURGICAL      wisdom teeth and implanted tooth       Current Outpatient Medications   Medication Sig Dispense Refill    multivitamin with iron (FLINTSTONES) chewable tablet Take 1 Tab by mouth two (2) times a day.       OTHER Calcium citrate liquid 500 milligrams 3xday      cyanocobalamin (VITAMIN B-12) 1,000 mcg tablet Take 1,000 mcg by mouth daily.  cholecalciferol (VITAMIN D3) 1,000 unit cap Take 5,000 Units by mouth daily.  thiamine HCL (VITAMIN B-1) 100 mg tablet Take  by mouth daily.  lisinopril (PRINIVIL, ZESTRIL) 10 mg tablet take 1 tablet by mouth once daily  0    simvastatin (ZOCOR) 20 mg tablet   0       No Known Allergies    ROS:  Positive in BOLD  CONST: Fever, weight loss, fatigue or chills  GI: Nausea, vomiting, abdominal pain, change in bowel habits, hematochezia, melena, and GERD   INTEG: Dermatitis, abnormal moles  HEENT: Recent changes in vision, vertigo, epistaxis, dysphagia and hoarseness  CV: Chest pain, palpitations, HTN, edema and varicosities  RESP: Cough, shortness of breath, wheezing, hemoptysis, snoring and reactive airway disease  : Hematuria, dysuria, frequency, urgency, nocturia and stress urinary incontinence   MS: Weakness, joint pain and arthritis  ENDO: Diabetes, thyroid disease, polyuria, polydipsia, polyphagia, poor wound healing, heat intolerance, cold intolerance  LYMPH/HEME: Anemia, bruising and history of blood transfusions  NEURO: Dizziness, headache, fainting, seizures and stroke  PSYCH: Anxiety and depression      Physicial Exam:  Visit Vitals  /68   Pulse (!) 54   Temp 98.6 °F (37 °C) (Oral)   Resp 18   Ht 6' (1.829 m)   Wt 108.7 kg (239 lb 9.6 oz)   SpO2 97%   BMI 32.50 kg/m²     General: 52 y.o.) male in no acute distress. HEENT: Normocephalic, atraumatic, Pupils equal and reactive, nasopharynx clear, oropharynx clear and moist without lesions  NECK: Supple, no lymphadenopathy, thyromegaly, carotid bruits or jugular venous distension. trachea midline  RESP: Clear to auscultation bilaterally, no wheezes, rhonchi, or rales, normal respiratory excursion  CV: Regular rate and rhythm, no murmurs, rubs or gallops. 3+/4 pulses in bilateral dorsalis pedis and posterior tibialis. No distal edema or varicosities.   ABD: Soft, nontender, nondistended, normoactive bowel sounds, no hernias, no hepatosplenomegaly  Extremities: Warm, well perfused, no tenderness or swelling, normal gait/station  Neuro: Sensation and strength grossly intact and symmetrical  Psych: Alert and oriented to person, place, and time. Labs: Reviewed     Assessment/Plan: Pt is currently 6 months s/p laparoscopic gastric bypass surgery with a total weight loss of 97 lbs to date, doing well  Labs were reviewed with patient. Patient was instructed to continue multivitamins, calcium, Vit D and B1 & 12 supplements  Stressed importance of hydration with SF clear liquids until urine clear. OK to advance diet as directed in bariatric manual with food content mainly meats/veggies. Requested RD meet with patient prior to February as he does not want to lose much more weight. Encouraged support group attendance  Advised exercise program of 30 minutes daily       Follow-up and Dispositions    · Return in about 6 months (around 5/25/2020). >50% of 25 minute visit spent face to face time with Cloretta Lard consisted of counseling & coordinating and/or discussing treatment plans in reference to his The primary encounter diagnosis was Postsurgical malabsorption. Diagnoses of History of Baljinder-en-Y gastric bypass, Class 1 obesity without serious comorbidity with body mass index (BMI) of 32.0 to 32.9 in adult, unspecified obesity type, BMI 32.0-32.9,adult, Essential hypertension, Hypercholesteremia, and JOSHUA on CPAP were also pertinent to this visit.         MAMTA Zee-BC

## 2020-01-14 ENCOUNTER — DOCUMENTATION ONLY (OUTPATIENT)
Dept: BARIATRICS/WEIGHT MGMT | Age: 50
End: 2020-01-14

## 2020-01-14 ENCOUNTER — HOSPITAL ENCOUNTER (OUTPATIENT)
Dept: BARIATRICS/WEIGHT MGMT | Age: 50
Discharge: HOME | End: 2020-01-14

## 2020-01-14 NOTE — PROGRESS NOTES
[address] Loss [facility], [address]      Patient's Name: Alka Benítez   Age: 52 y.o. YOB: 1970   Sex: male    Date:   1/14/2020    Height: 6 f  Weight:    242      Lbs. BMI:      Surgery Date: 4/30/19    Starting Weight: 336   Last Recorded Weight:   Overall Pounds Lost: 94     Procedure: Gastric Bypass    Lowest Weight patient has achieved since surgery: 234 (Patient states this was 1 month ago). He states that his weight has been fluctuating up and down. How long has patient been at this weight for: A few weeks. He states this morning after the shower, he weighed 236. Other Pertinent Information:     Diet History (reported by patient on diet history form)    Do you smoke: None    Alcohol Intake: None drinks at a time, None times a week. Meals per day: 3 meals plus 2 shakes per day. Diet History:  Patient states he typically eats 3 meals per day. He states he may do 2 meals and shake. He states he will do a shake between 5:00-5:30 am.  Patient states he is doing [employer]. He states he may stop and get a biscuit, but most days he will not eat until 11 am.  He states at 11 am, he may have turkey, chicken, leftovers from the night before. Dinner is at 6-7 pm.  Patient states this is more likely chicken. He states he can digest beef, but states he gets very full from it. He states he eats snap string beans or carrots. He states he does very little bed time snacking. Portion sizes ~:  1/2 chicken breast and vegetables. Patient states if he eats too fast or too much, he does gets sick. Simple sugar intake: He states once in a while he may have a sugar free mint. Experiencing dumping syndrome: None    Carbohydrate intake:   Patient states he has had some carbohydrates, but is keeping them under 50 grams per day.     Symptoms that occur when carbohydrates are consumed: Patient states he hasn't experienced reactive hypoglycemia. Ounces of fluid consumed per day: 115 ounces of water per day    Fluids being consumed: water    Patient is drinking protein drinks. He states he is getting 3 meals per day and usually get 1-2 shakes per day. Patient is snacking on:  Keeping his snacking to a minimum. Exercise History    Patient states he was doing a lot of walking, but states he slacked off that for exercise. Vitamins    Patient is taking bid Multivitamins per day    Patient is taking Calcium in the form of liquid. Patient is taking 1500 mg per day. Patient is taking 1000 mcg of Vitamin B12. Patient is taking 5000 IU of Vitamin D 3 today. Patient is taking 100 mg of Vitamin B1. Summary:  Patient attended 9 month post-op class. Weight loss is at 94 pounds. I have reinforced the key diet principles to the patient. Patient was instructed to follow a 3 meal a day routine. I have reinforced the importance of filling up on meat and vegetables and avoiding carbohydrates. I have talked with patient about reactive hypoglycemia and although carbohydrates are not good from a weight-management point of view, they are actually very dangerous and should be avoided. If patient is getting hungry between meals focus on meat and vegetables and a list of food choices was reviewed with patient. Reinforced to patient the importance of being properly hydrated and the need to get 64 ounces of fluid in per day. Reinforced to patient the need to do 30 minutes of exercise per day. We also spent some time talking about the vitamins and the importance of taking them. Reinforced the dosage of vitamins to patient. Patient was reminded that the vitamins are lifelong. Other Pertinent Information: patient was losing weight rapidly, but his weight has stabilized . His portions are appropriate. He is keeping his carbohydrates to a minimum. He is not eating sweets.   He was walking more in the fall, but has decreased some. I have encouraged him to get back into this routine. Overall doing well. Will monitor his progress.       Briseyda Devine Mane 87 RD  1/14/2020

## 2020-03-19 ENCOUNTER — DOCUMENTATION ONLY (OUTPATIENT)
Dept: SURGERY | Age: 50
End: 2020-03-19

## 2020-03-19 NOTE — LETTER
[facility] [place] [facility] [place] [facility]/[address] Dear Patient, Your health is our main concern. It is important for your health to have follow-up lab work and to see you surgeon at 3 months, 6 months and annually after your weight loss surgery. Additionally, the Department of Bariatric Surgery at our hospital is a member of the [employer] [address] Surgical Quality Improvement Program ([facility] NSQIP). As a participant in this program, we gather information on the outcomes of our patients after surgery. Please call the office for a follow up appointment at [phone]. If you have moved out of the area or have changed surgeons please call us and let us know the name of your doctor. Your health and feedback are important to us. We greatly appreciate your response. Thank you, [facility] [place] [address] [place] [address],B-1

## 2020-03-19 NOTE — PROGRESS NOTES
Per [facility] requirements;  E-mail and letter sent for follow up appointment. [facility] [place]   [facility]  [facility]        Dear Patient,        Your health is our main concern. It is important for your health to have follow-up lab work and to see you surgeon at 3 months, 6 months and annually after your weight loss surgery. Additionally, the Department of Bariatric Surgery at our hospital is a member of the [employer] [address] Surgical Quality Improvement Program ([facility] NSQIP). As a participant in this program, we gather information on the outcomes of our patients after surgery. Please call the office for a follow up appointment at [phone]. If you have moved out of the area or have changed surgeons please call us and let us know the name of your doctor. Your health and feedback are important to us. We greatly appreciate your response.        Thank you,  [facility]

## 2020-05-13 ENCOUNTER — VIRTUAL VISIT (OUTPATIENT)
Dept: SURGERY | Age: 50
End: 2020-05-13

## 2020-05-13 VITALS — WEIGHT: 240 LBS | BODY MASS INDEX: 32.51 KG/M2 | HEIGHT: 72 IN

## 2020-05-13 DIAGNOSIS — G47.33 OSA ON CPAP: ICD-10-CM

## 2020-05-13 DIAGNOSIS — E66.9 OBESITY (BMI 30-39.9): Primary | ICD-10-CM

## 2020-05-13 DIAGNOSIS — I10 ESSENTIAL HYPERTENSION: ICD-10-CM

## 2020-05-13 DIAGNOSIS — Z98.84 HISTORY OF ROUX-EN-Y GASTRIC BYPASS: ICD-10-CM

## 2020-05-13 DIAGNOSIS — Z99.89 OSA ON CPAP: ICD-10-CM

## 2020-05-13 DIAGNOSIS — K91.2 POSTOPERATIVE MALABSORPTION: ICD-10-CM

## 2020-05-13 DIAGNOSIS — E78.00 HYPERCHOLESTEREMIA: ICD-10-CM

## 2020-05-13 NOTE — PROGRESS NOTES
Chief Complaint   Patient presents with    Follow-up     GBP 4/30/2019     Pt ID confirmed    Weight Loss Metrics 5/13/2020 11/25/2019 11/25/2019 8/12/2019 8/12/2019 5/16/2019 5/16/2019   Pre op / Initial Wt - 336 - 336 - 336 -   Today's Wt 240 lb - 239 lb 9.6 oz - 256 lb 6.4 oz - 303 lb   BMI 32.55 kg/m2 - 32.5 kg/m2 - 34.77 kg/m2 - 41.09 kg/m2   Ideal Body Wt - 163.5 - 163.5 - 164 -   Excess Body Wt - 172.5 - 172.5 - 172 -   Goal Wt - - - - - 198 -   Wt loss to date - 96.4 - 79.6 - 33 -   % Wt Loss - 0.7 - 0.58 - 0.24 -   80% EBW - 138 - 138 - 137.6 -       Body mass index is 32.55 kg/m².     Post op medications:  MVI: flintstones twice daily  Calcium Citrate: 1500 milligrams  B-1 100 milligrams  B-12 1000 micrograms  Vit D 3 5000 units

## 2020-05-13 NOTE — Clinical Note
Labs scanned in but missing B1 B12 and D please call [phone] lab and request records, if these were not preformed needs to be ordered for patient and we need to have him go for retesting.  Thanks

## 2020-05-13 NOTE — PROGRESS NOTES
Consent:  Sid Verdin and/or his healthcare decision maker is aware that this patient-initiated Telehealth encounter is a billable service, with coverage as determined by his insurance carrier. He is aware that he may receive a bill and has provided verbal consent to proceed: Yes    Provider location while conducting visit: in the office  Patient location: Home  Other person participating in the telehealth services: Lopez Starr    This virtual visit was conducted via interactive/real-time audio/video using [email or web address]. me   Visit start: [address] [place] [address]  Visit end: [facility]          Bariatric Postoperative Progress Note    Sid Verdin is a 52 y.o. male is now 1 years status post laparoscopic gastric bypass surgery who was seen by synchronous (real-time) audio-video technology on 5/13/2020. Doing well overall. Currently on a stage 4 diet without difficulty. Taking in >200 oz water,  60-80g protein. 30 min of activity 5-7 days a week. Bowel movements are regular. The patient is not having any pain. He is compliant with multivitamins, calcium, Vit D and B12 supplements. Patient has maintained for past 6 months and is at his personal goal weight.        Weight Loss Metrics 5/13/2020 5/13/2020 11/25/2019 11/25/2019 8/12/2019 8/12/2019 5/16/2019   Pre op / Initial Wt 336 - 336 - 336 - 336   Today's Wt - 240 lb - 239 lb 9.6 oz - 256 lb 6.4 oz -   BMI - 32.55 kg/m2 - 32.5 kg/m2 - 34.77 kg/m2 -   Ideal Body Wt 163 - 163.5 - 163.5 - 164   Excess Body Wt 173 - 172.5 - 172.5 - 172   Goal Wt - - - - - - 198   Wt loss to date 96 - 96.4 - 79.6 - 33   % Wt Loss 0.69 - 0.7 - 0.58 - 0.24   80% .4 - 138 - 138 - 137.6       Comorbidities:  Hypertension: improved  Diabetes: not applicable  Obstructive Sleep Apnea: improved  Hyperlipidemia: improved  Stress Urinary Incontinence: not applicable  Gastroesophageal Reflux: not applicable  Weight related arthropathy:not applicable      Past Medical History:   Diagnosis Date    Arthritis     Hypercholesterolemia     Hypertension     Sleep apnea     cpap       Past Surgical History:   Procedure Laterality Date    HX GI  04/30/2019    gastric bypass    HX KNEE ARTHROSCOPY Left     torn meniscus    HX OTHER SURGICAL      wisdom teeth and implanted tooth       Current Outpatient Medications   Medication Sig Dispense Refill    multivitamin with iron (FLINTSTONES) chewable tablet Take 1 Tab by mouth two (2) times a day.  OTHER Calcium citrate liquid 500 milligrams 3xday      cyanocobalamin (VITAMIN B-12) 1,000 mcg tablet Take 1,000 mcg by mouth daily.  cholecalciferol (VITAMIN D3) 1,000 unit cap Take 5,000 Units by mouth daily.  thiamine HCL (VITAMIN B-1) 100 mg tablet Take  by mouth daily.  lisinopril (PRINIVIL, ZESTRIL) 10 mg tablet take 1 tablet by mouth once daily  0    simvastatin (ZOCOR) 20 mg tablet   0       No Known Allergies    ROS:  Review of Systems   Musculoskeletal: Positive for joint pain. All other systems reviewed and are negative. Physicial Exam:  Visit Vitals  Ht 6' (1.829 m)   Wt 108.9 kg (240 lb) Comment: pt took weight at home   BMI 32.55 kg/m²    (As obtained by patient/caregiver at home)  Physical Exam  Vitals signs and nursing note reviewed. Constitutional:       Appearance: He is obese. HENT:      Head: Normocephalic and atraumatic. Neck:      Musculoskeletal: Normal range of motion. Pulmonary:      Effort: Pulmonary effort is normal.   Neurological:      Mental Status: He is alert and oriented to person, place, and time. Labs: reviewed, see scanned       Assessment/Plan: TodayTimoteo is  Height: 6' (182.9 cm) tall, Weight: 108.9 kg (240 lb)(pt took weight at home) lbs for a Body mass index is 32.55 kg/m². and are suffering from obesity . They are currently 1 year s/p laparoscopic gastric bypass surgery with a total weight loss of 96 lbs to date, doing well at personal goal, patient aware BMI is still in obese category. Labs were reviewed  and pt was instructed to continue MVI/Ca/B12/D3 supplementation. B1, B12, D are not in scanned results will have to call [facility] lab and request records or retest, patient aware. Stressed importance of hydration with SF clear liquids until urine clear. Continue with food content mainly protein veggies. Encouraged support group attendance. Advised exercise program of 150 mins per week. We discussed the expected course, resolution and complications of the diagnosis(es) in detail. Medication risks, benefits, costs, interactions, and alternatives were discussed as indicated. I advised him to contact the office if his condition worsens, changes or fails to improve as anticipated. He expressed understanding with the diagnosis(es) and plan. The primary encounter diagnosis was Obesity (BMI 30-39.9). Diagnoses of BMI 32.0-32.9,adult, History of Baljindre-en-Y gastric bypass, Postoperative malabsorption, Essential hypertension, Hypercholesteremia, and JOSHUA on CPAP were also pertinent to this visit. Follow up yearly with labs, sooner as needed. Services were provided through a video synchronous discussion virtually to substitute for in-person clinic visit. Pursuant to the emergency declaration under the [facility], 1135 waiver authority and the [employer] General Act, this Virtual  Visit was conducted, with patient's consent, to reduce the patient's risk of exposure to COVID-19 and provide continuity of care for an established patient.     Janneth Javier NP

## 2020-06-23 VITALS
DIASTOLIC BLOOD PRESSURE: 81 MMHG | SYSTOLIC BLOOD PRESSURE: 135 MMHG | HEART RATE: 61 BPM | TEMPERATURE: 98.5 F | WEIGHT: 315 LBS | BODY MASS INDEX: 42.66 KG/M2 | HEIGHT: 72 IN | OXYGEN SATURATION: 96 %

## 2020-07-13 ENCOUNTER — HOSPITAL ENCOUNTER (OUTPATIENT)
Dept: LAB | Age: 50
Discharge: HOME | End: 2020-07-13
Payer: COMMERCIAL

## 2020-07-13 LAB
ALBUMIN SERPL-MCNC: 3.7 G/DL (ref 3.4–5)
ALBUMIN/GLOB SERPL: 1.2 {RATIO} (ref 0.8–1.7)
ALP SERPL-CCNC: 76 U/L (ref 45–117)
ALT SERPL-CCNC: 19 U/L (ref 16–61)
ANION GAP SERPL CALC-SCNC: 5 MMOL/L (ref 3–18)
AST SERPL-CCNC: 11 U/L (ref 10–38)
ATRIAL RATE: 48 BPM
BILIRUB SERPL-MCNC: 0.9 MG/DL (ref 0.2–1)
BUN SERPL-MCNC: 11 MG/DL (ref 7–18)
BUN/CREAT SERPL: 15 (ref 12–20)
CALCIUM SERPL-MCNC: 9 MG/DL (ref 8.5–10.1)
CALCULATED P AXIS, ECG09: 41 DEGREES
CALCULATED R AXIS, ECG10: 29 DEGREES
CALCULATED T AXIS, ECG11: 40 DEGREES
CHLORIDE SERPL-SCNC: 105 MMOL/L (ref 100–111)
CO2 SERPL-SCNC: 29 MMOL/L (ref 21–32)
CREAT SERPL-MCNC: 0.73 MG/DL (ref 0.6–1.3)
DIAGNOSIS, 93000: NORMAL
GLOBULIN SER CALC-MCNC: 3 G/DL (ref 2–4)
GLUCOSE SERPL-MCNC: 91 MG/DL (ref 74–99)
P-R INTERVAL, ECG05: 156 MS
POTASSIUM SERPL-SCNC: 4 MMOL/L (ref 3.5–5.5)
PROT SERPL-MCNC: 6.7 G/DL (ref 6.4–8.2)
Q-T INTERVAL, ECG07: 444 MS
QRS DURATION, ECG06: 90 MS
QTC CALCULATION (BEZET), ECG08: 396 MS
SODIUM SERPL-SCNC: 139 MMOL/L (ref 136–145)
VENTRICULAR RATE, ECG03: 48 BPM

## 2020-07-13 PROCEDURE — 36415 COLL VENOUS BLD VENIPUNCTURE: CPT

## 2020-07-13 PROCEDURE — 93005 ELECTROCARDIOGRAM TRACING: CPT

## 2020-07-13 PROCEDURE — 80053 COMPREHEN METABOLIC PANEL: CPT

## 2020-09-09 ENCOUNTER — OFFICE VISIT (OUTPATIENT)
Dept: FAMILY MEDICINE CLINIC | Age: 50
End: 2020-09-09
Payer: COMMERCIAL

## 2020-09-09 VITALS
WEIGHT: 255 LBS | TEMPERATURE: 97.3 F | HEART RATE: 52 BPM | BODY MASS INDEX: 34.54 KG/M2 | HEIGHT: 72 IN | SYSTOLIC BLOOD PRESSURE: 121 MMHG | OXYGEN SATURATION: 98 % | DIASTOLIC BLOOD PRESSURE: 81 MMHG

## 2020-09-09 DIAGNOSIS — Z00.00 ENCOUNTER FOR PREVENTATIVE ADULT HEALTH CARE EXAMINATION: ICD-10-CM

## 2020-09-09 DIAGNOSIS — Z00.00 ENCOUNTER FOR PREVENTATIVE ADULT HEALTH CARE EXAMINATION: Primary | ICD-10-CM

## 2020-09-09 PROBLEM — Z12.11 ENCOUNTER FOR SCREENING COLONOSCOPY: Status: ACTIVE | Noted: 2020-09-09

## 2020-09-09 PROCEDURE — 99396 PREV VISIT EST AGE 40-64: CPT | Performed by: FAMILY MEDICINE

## 2020-09-09 NOTE — PROGRESS NOTES
Subjective:   Ara Foster is a 48 y.o. male who was seen for Annual Wellness Visit    HPI 8is a 68-year-old male who is seen for evaluation. It is his wellness exam.  He had bariatric surgery 2 years ago and I do not think have seen him since that time. He is lost 100 pounds. He is trying to stay with diet he has had some left knee surgery recently. He has had no vomiting or diarrhea. No rash no syncope or loss of consciousness. He denies anxiety or depression he is not in any significant relationship. He has had no falls or injuries. He is not exercising as much as he would like. His bowel movements are good he has not had a screening colonoscopy. He has not had any difficulties with voiding no rashes. He is pleasant and cooperative he is still taking blood pressure medication and cholesterol medication he is insisting he had a bariatric evaluation in the spring. They did not discuss any other medication changes. Retired from working with the [employer] after 28 years. Will be looking for part-time work he is not clinically anxious or depressed he is feeling quite good about life. Home Medications    Medication Sig Start Date End Date Taking? Authorizing Provider   multivitamin with iron (FLINTSTONES) chewable tablet Take 1 Tab by mouth two (2) times a day. Yes Provider, Historical   OTHER Calcium citrate liquid 500 milligrams 3xday   Yes Provider, Historical   cyanocobalamin (VITAMIN B-12) 1,000 mcg tablet Take 1,000 mcg by mouth daily. Yes Provider, Historical   cholecalciferol (VITAMIN D3) 1,000 unit cap Take 5,000 Units by mouth daily. Yes Provider, Historical   thiamine HCL (VITAMIN B-1) 100 mg tablet Take  by mouth daily.    Yes Provider, Historical   lisinopril (PRINIVIL, ZESTRIL) 10 mg tablet take 1 tablet by mouth once daily 10/16/17  Yes Provider, Historical   simvastatin (ZOCOR) 20 mg tablet  11/21/17  Yes Provider, Historical      No Known Allergies  Social History     Tobacco Use    Smoking status: Former Smoker     Last attempt to quit: 1/10/2010     Years since quitting: 10.6    Smokeless tobacco: Never Used   Substance Use Topics    Alcohol use: Not Currently     Comment: occasional    Drug use: No            Review of Systems   Constitutional: Negative. HENT: Negative. Eyes: Negative. Respiratory: Negative. Cardiovascular: Negative. Gastrointestinal: Negative. Endocrine: Negative. Genitourinary: Negative. Allergic/Immunologic: Negative. Neurological: Negative. Physical Exam   Objective:     Visit Vitals  /81 (BP 1 Location: Left arm, BP Patient Position: Sitting)   Pulse (!) 52   Temp 97.3 °F (36.3 °C)   Ht 6' (1.829 m)   Wt 255 lb (115.7 kg)   SpO2 98%   BMI 34.58 kg/m²      General: alert, cooperative, no distress   Mental  status: normal mood, behavior, speech, dress, motor activity, and thought processes, able to follow commands   HENT: NCAT   Neck: no visualized mass   Resp: no respiratory distress   Neuro: no gross deficits   Skin: no discoloration or lesions of concern on visible areas   Psychiatric: normal affect, consistent with stated mood, no evidence of hallucinations       Assessment & Plan:     Health examination: This gentleman looks great today. His vital signs are excellent. I do think he needs a complete metabolic panel and will need a lipid panel as well. He did have a prostate exam today which was normal.  Normal genitalia with no hernias he is alert and cooperative in no significant distress. He is oriented x3. His gait seems normal his knee seems to have healed the scars are appropriate. He is still using his CPAP which I have asked him to continue to use I will call him with the results of his blood work. We may be able to change some of his medications but I do not want to do it at this time.   I have allowed him to ventilate regarding issues in his life he is recently retired after 29 years with the [employer]. He will be looking for some part-time work in the near future. 712    Additional exam findings: We discussed the expected course, resolution and complications of the diagnosis(es) in detail. Medication risks, benefits, costs, interactions, and alternatives were discussed as indicated. I advised him to contact the office if his condition worsens, changes or fails to improve as anticipated. He expressed understanding with the diagnosis(es) and plan.

## 2020-09-09 NOTE — PROGRESS NOTES
Pedro Wilver presents today for   Chief Complaint   Patient presents with    Annual Wellness Visit         Depression Screening:  3 most recent PHQ Screens 9/9/2020   Little interest or pleasure in doing things Not at all   Feeling down, depressed, irritable, or hopeless Not at all   Total Score PHQ 2 0       Learning Assessment:  Learning Assessment 9/9/2020   PRIMARY LEARNER Patient   HIGHEST LEVEL OF EDUCATION - PRIMARY LEARNER  GRADUATED HIGH SCHOOL OR GED   BARRIERS PRIMARY LEARNER NONE   CO-LEARNER CAREGIVER No   PRIMARY LANGUAGE ENGLISH   LEARNER PREFERENCE PRIMARY DEMONSTRATION     -   ANSWERED BY patient   RELATIONSHIP SELF       Health Maintenance reviewed and discussed and ordered per Provider. Health Maintenance Due   Topic Date Due    DTaP/Tdap/Td series (1 - Tdap) 06/29/1991    Lipid Screen  08/23/2019    Shingrix Vaccine Age 50> (1 of 2) 06/29/2020    FOBT Q1Y Age 50-75  06/29/2020    Flu Vaccine (1) 09/01/2020   . Coordination of Care:  1. Have you been to the ER, urgent care clinic since your last visit? Hospitalized since your last visit? no    2. Have you seen or consulted any other health care providers outside of the [address] [place] [address] since your last visit? Include any pap smears or colon screening.  no      Last UDS Checked no  Last Pain contract signed: no

## 2020-10-09 PROBLEM — Z12.11 ENCOUNTER FOR SCREENING COLONOSCOPY: Status: RESOLVED | Noted: 2020-09-09 | Resolved: 2020-10-09

## 2020-10-09 PROBLEM — Z00.00 ENCOUNTER FOR ROUTINE ADULT HEALTH EXAMINATION WITHOUT ABNORMAL FINDINGS: Status: RESOLVED | Noted: 2020-09-09 | Resolved: 2020-10-09

## 2020-10-23 DIAGNOSIS — I10 HYPERTENSION, UNSPECIFIED TYPE: Primary | ICD-10-CM

## 2020-10-23 RX ORDER — LISINOPRIL 10 MG/1
10 TABLET ORAL DAILY
Qty: 90 TAB | Refills: 3 | Status: SHIPPED | OUTPATIENT
Start: 2020-10-23 | End: 2021-01-21

## 2020-10-23 NOTE — TELEPHONE ENCOUNTER
Order placed for lisinopril 10 mg, # 90, PO, per Verbal Order from Dr. Judge Meneses on 10/23/2020  Last office visit:  July 2020    Provider is aware of last labs, office visit and follow up. No further action requested from provider.

## 2021-03-09 ENCOUNTER — DOCUMENTATION ONLY (OUTPATIENT)
Dept: SURGERY | Age: 51
End: 2021-03-09

## 2021-03-09 NOTE — PROGRESS NOTES
Per [facility] requirements;  E-mail and letter sent for follow up appointment. [facility]  [place] [facility] [place]   [facility]  [facility]/[address]        Dear Patient,        Your health is our main concern. It is important for your health to have follow-up lab work and to see your surgeon at 3 months, 6 months and annually after your weight loss surgery. Additionally, the Department of Bariatric Surgery at our hospital is a member of the [employer] of [address] [place] [address] Surgical Quality Improvement Program ([facility] NSQIP). As a participant in this program, we gather information on the outcomes of our patients after surgery. Please call the office for a follow up appointment at [phone]. If you have moved out of the area or have changed surgeons please call us and let us know the name of your doctor. Your health and feedback are important to us. We greatly appreciate your response.        Thank you,  [facility] [place] [facility]

## 2021-03-30 NOTE — PROGRESS NOTES
Nutrition Evaluation    Patient's Name: Rossy Soliz   Age: 50 y.o. YOB: 1970   Sex: male    Height: 6 f  Weight: 334 BMI:  45.4  Starting Weight:  339        Smoking Status:  None  Alcohol Intake:  Number of Drinks at a Time: Stopped last month  Number of Times a Week:     Changes made during classes include:  Stopped bread and pasta  Stopped alcohol        Two things that patient learned during this weight loss trial:  It's hard  How to cut back. Summary:  I feel that Rossy Soliz has demonstrated appropriate diet changes and is ready to move forward with surgery. Patient has been briefed on the importance of the protein drinks, vitamins, and the transition of the diet stages. Patient understands that the long-term diet will focus on protein and vegetables. Patient understand the effects of carbohydrates after surgery and what reactive hypoglycemia is. Patient is aware that they will be attending pre-op class 2 weeks before surgery and will get more detailed information on the post-op diet guidelines. Patient will see me again at 6 weeks post-op. At this 6 week visit, RD will assess how patient is tolerating soft protein and advance to vegetables, if tolerating soft protein without difficulty. Patient will also see RD again at 9 months post-op. This visit will assess patient's compliance with current protocol, including diet, vitamins, protein shakes, and exercise. Post-op diet guidelines will be reinforced. RD is available for questions and to meet with patient outside of the 6 week and 9 month post-op visit. We spent a lot of time talking about the vitamins. Patient understands the importance of being compliant with the diet protocol and the complications and risks that can occur if they are non-compliant with the nutritional protocol. Patient has attended at least one support group.     Candidate for surgery: Yes  Re-evaluation Date:     Procedure:  Gastric Bypass Patient made aware of 24/7 emergency services. Briseyda Wills [address]  3/14/2019

## 2021-08-17 ENCOUNTER — TELEPHONE (OUTPATIENT)
Dept: FAMILY MEDICINE CLINIC | Age: 51
End: 2021-08-17

## 2021-08-17 ENCOUNTER — VIRTUAL VISIT (OUTPATIENT)
Dept: FAMILY MEDICINE CLINIC | Age: 51
End: 2021-08-17
Payer: COMMERCIAL

## 2021-08-17 DIAGNOSIS — U07.1 COVID-19: Primary | ICD-10-CM

## 2021-08-17 PROCEDURE — 99442 PR PHYS/QHP TELEPHONE EVALUATION 11-20 MIN: CPT | Performed by: FAMILY MEDICINE

## 2021-08-17 RX ORDER — LISINOPRIL 10 MG/1
10 TABLET ORAL DAILY
COMMUNITY
Start: 2021-07-31 | End: 2021-11-02 | Stop reason: SDUPTHER

## 2021-08-17 NOTE — PROGRESS NOTES
Porsha Baugh is a 46 y.o. male, evaluated via audio-only technology on 8/17/2021 for Positive For Covid-19 (at home test)  . Assessment & Plan:   covid 19. 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 He is isolated for 9 days and needs 5 or 6 more days of isolation. He has not needed antibiotic therapy. He is taking aspirin therapy. He has had no fever chills or other issues. I am giving him a note to return to work on 8/25/2021. He is going to take 1 more test the end of the week to make sure it is negative. He will call me if any other issues arise. This was a 15-minute telephone to telephone interaction I was in my office the patient was at his home    12  Subjective: The patient is a 51-year-old male who is seen with telephone to telephone interaction. He had Covid that was self diagnosed with treatment 9 days ago he needs a note to return to work on 8/25 has had subsequent cough no nausea vomiting or diarrhea. No rash no syncope no loss of consciousness. Bowel movements have been appropriate. Eating relatively well. Nobody at home has been sick he has a girlfriend but they have isolated and she has been negative. He is not a smoker no alcohol use no chest pain no shortness of breath no pain in his extremities as well. He has had no falls or injuries he is eating well. He is feeling better on a daily basis he had 5 days of fever he has had some congestion and cough. No rashes       Prior to Admission medications    Medication Sig Start Date End Date Taking? Authorizing Provider   lisinopriL (PRINIVIL, ZESTRIL) 10 mg tablet Take 10 mg by mouth daily. 7/31/21  Yes Provider, Historical   multivitamin with iron (FLINTSTONES) chewable tablet Take 1 Tab by mouth two (2) times a day.    Yes Provider, Historical   OTHER Calcium citrate liquid 500 milligrams 3xday   Yes Provider, Historical   cyanocobalamin (VITAMIN B-12) 1,000 mcg tablet Take 1,000 mcg by mouth daily. Yes Provider, Historical   cholecalciferol (VITAMIN D3) 1,000 unit cap Take 5,000 Units by mouth daily. Yes Provider, Historical   thiamine HCL (VITAMIN B-1) 100 mg tablet Take  by mouth daily. Yes Provider, Historical   simvastatin (ZOCOR) 20 mg tablet  11/21/17  Yes Provider, Historical     Patient Active Problem List   Diagnosis Code    Morbid obesity with BMI of 45.0-49.9, adult ([facility] E66.01, Z68.42    BMI 45.0-49.9, adult ([facility] Z68.42    Essential hypertension I10    Mixed hyperlipidemia E78.2    JOSHUA on CPAP G47.33, Z99.89    [facility] E66.01       Review of Systems   Constitutional: Positive for malaise/fatigue. HENT: Positive for congestion. Eyes: Negative. Respiratory: Positive for cough. Cardiovascular: Negative. Gastrointestinal: Negative. Genitourinary: Negative. Skin: Negative. Neurological: Negative. Psychiatric/Behavioral: Negative. No flowsheet data found. Chinyere Holly, who was evaluated through a patient-initiated, synchronous (real-time) audio only encounter, and/or her healthcare decision maker, is aware that it is a billable service, with coverage as determined by his insurance carrier. He provided verbal consent to proceed: n/a- consent obtained within past 12 months. He has not had a related appointment within my department in the past 7 days or scheduled within the next 24 hours.         Mitra Smith MD

## 2021-08-17 NOTE — PROGRESS NOTES
Marina Hu presents today for   Chief Complaint   Patient presents with    Positive For Covid-19     at home test         Depression Screening:  3 most recent PHQ Screens 8/17/2021   Little interest or pleasure in doing things Not at all   Feeling down, depressed, irritable, or hopeless Not at all   Total Score PHQ 2 0       Learning Assessment:  Learning Assessment 9/9/2020   PRIMARY LEARNER Patient   HIGHEST LEVEL OF EDUCATION - PRIMARY LEARNER  GRADUATED HIGH SCHOOL OR GED   BARRIERS PRIMARY LEARNER NONE   CO-LEARNER CAREGIVER No   PRIMARY LANGUAGE ENGLISH   LEARNER PREFERENCE PRIMARY DEMONSTRATION     -   ANSWERED BY patient   RELATIONSHIP SELF       Health Maintenance reviewed and discussed and ordered per Provider. Health Maintenance Due   Topic Date Due    Hepatitis C Screening  Never done    COVID-19 Vaccine (1) Never done    DTaP/Tdap/Td series (1 - Tdap) Never done    Colorectal Cancer Screening Combo  Never done    Lipid Screen  08/23/2019    Shingrix Vaccine Age 50> (1 of 2) Never done   . Coordination of Care:  1. Have you been to the ER, urgent care clinic since your last visit? Hospitalized since your last visit? No    2. Have you seen or consulted any other health care providers outside of the [address] [place] [address] since your last visit? Include any pap smears or colon screening.  No

## 2021-11-02 RX ORDER — LISINOPRIL 10 MG/1
10 TABLET ORAL DAILY
Qty: 90 TABLET | Refills: 0 | Status: SHIPPED | OUTPATIENT
Start: 2021-11-02 | End: 2022-02-10

## 2021-11-02 NOTE — TELEPHONE ENCOUNTER
Pt needs rx refill. Requested Prescriptions     Pending Prescriptions Disp Refills    lisinopriL (PRINIVIL, ZESTRIL) 10 mg tablet       Sig: Take 1 Tablet by mouth daily. appt has been made for 12/08.

## 2021-12-08 ENCOUNTER — OFFICE VISIT (OUTPATIENT)
Dept: FAMILY MEDICINE CLINIC | Age: 51
End: 2021-12-08
Payer: COMMERCIAL

## 2021-12-08 VITALS
HEART RATE: 52 BPM | WEIGHT: 255 LBS | TEMPERATURE: 98.1 F | BODY MASS INDEX: 34.58 KG/M2 | SYSTOLIC BLOOD PRESSURE: 116 MMHG | DIASTOLIC BLOOD PRESSURE: 72 MMHG | OXYGEN SATURATION: 94 %

## 2021-12-08 DIAGNOSIS — Z11.59 NEED FOR HEPATITIS C SCREENING TEST: ICD-10-CM

## 2021-12-08 DIAGNOSIS — E55.9 VITAMIN D DEFICIENCY: ICD-10-CM

## 2021-12-08 DIAGNOSIS — Z13.9 SCREENING DUE: ICD-10-CM

## 2021-12-08 DIAGNOSIS — I10 ESSENTIAL HYPERTENSION: ICD-10-CM

## 2021-12-08 DIAGNOSIS — Z12.11 SCREEN FOR COLON CANCER: Primary | ICD-10-CM

## 2021-12-08 DIAGNOSIS — Z13.0 SCREENING FOR DEFICIENCY ANEMIA: ICD-10-CM

## 2021-12-08 DIAGNOSIS — E78.2 MIXED HYPERLIPIDEMIA: ICD-10-CM

## 2021-12-08 PROCEDURE — 99214 OFFICE O/P EST MOD 30 MIN: CPT | Performed by: NURSE PRACTITIONER

## 2021-12-08 RX ORDER — CALCIUM CARBONATE 600 MG
600 TABLET ORAL 2 TIMES DAILY
COMMUNITY

## 2021-12-08 RX ORDER — SIMVASTATIN 20 MG/1
TABLET, FILM COATED ORAL
Refills: 0 | Status: CANCELLED | OUTPATIENT
Start: 2021-12-08

## 2021-12-08 RX ORDER — LISINOPRIL 10 MG/1
10 TABLET ORAL DAILY
Qty: 90 TABLET | Refills: 0 | Status: CANCELLED | OUTPATIENT
Start: 2021-12-08

## 2021-12-08 NOTE — PROGRESS NOTES
History of Present Illness  Jesse Edwards is a 46 y.o. male who presents today for:    Chief Complaint   Patient presents with    [facility]     establish care with provider was a [place] patient      Past Medical History  Past Medical History:   Diagnosis Date    Arthritis     Bronchitis     Hypercholesterolemia     Hypertension     Sleep apnea     cpap        Surgical History  Past Surgical History:   Procedure Laterality Date    HX GI  2019    gastric bypass    HX KNEE ARTHROSCOPY Left     torn meniscus    HX OTHER SURGICAL      wisdom teeth and implanted tooth        Current Medications  Current Outpatient Medications   Medication Sig    calcium carbonate (CALTREX) 600 mg calcium (1,500 mg) tablet Take 600 mg by mouth two (2) times a day.  lisinopriL (PRINIVIL, ZESTRIL) 10 mg tablet Take 1 Tablet by mouth daily.  multivitamin with iron (FLINTSTONES) chewable tablet Take 1 Tab by mouth two (2) times a day.  cyanocobalamin (VITAMIN B-12) 1,000 mcg tablet Take 1,000 mcg by mouth daily.  cholecalciferol (VITAMIN D3) 1,000 unit cap Take 5,000 Units by mouth daily.  thiamine HCL (VITAMIN B-1) 100 mg tablet Take  by mouth daily.  simvastatin (ZOCOR) 20 mg tablet     OTHER Calcium citrate liquid 500 milligrams 3xday (Patient not taking: Reported on 2021)     No current facility-administered medications for this visit.        Allergies/Drug Reactions  No Known Allergies     Family History  Family History   Problem Relation Age of Onset    Kidney Disease Mother     Heart Disease Mother     Diabetes Mother     No Known Problems Father         Social History  Social History     Tobacco Use    Smoking status: Former Smoker     Quit date: 1/10/2010     Years since quittin.9    Smokeless tobacco: Never Used   Substance Use Topics    Alcohol use: Not Currently     Comment: occasional    Drug use: No        Health Maintenance   Topic Date Due    Hepatitis C Screening Never done    DTaP/Tdap/Td series (1 - Tdap) Never done    Colorectal Cancer Screening Combo  Never done    Lipid Screen  08/23/2019    Shingrix Vaccine Age 50> (1 of 2) Never done    COVID-19 Vaccine (2 - Pfizer 3-dose booster series) 12/09/2021    Flu Vaccine (1) 06/30/2022 (Originally 9/1/2021)    Pneumococcal 0-64 years  Aged [employer] History   Administered Date(s) Administered    COVID-19, PFIZER, MRNA, LNP-S, PF, 30MCG/0.3ML DOSE 11/18/2021       Review of Systems  Review of Systems   Constitutional: Negative. HENT: Negative. Eyes: Negative. Respiratory: Negative. Cardiovascular: Negative. Gastrointestinal: Negative. Genitourinary: Negative. Musculoskeletal: Negative. Skin: Negative. Neurological: Negative. Endo/Heme/Allergies: Negative. Psychiatric/Behavioral: Negative. Physical Exam  Vital signs:   Vitals:    12/08/21 0902   BP: 116/72   Pulse: (!) 52   Temp: 98.1 °F (36.7 °C)   SpO2: 94%   Weight: 255 lb (115.7 kg)     General: alert, oriented, not in distress  Head: scalp normal, atraumatic  Eyes: pupils are equal and reactive, full and intact EOM's  Ears: patent ear canal, intact tympanic membrane  Nose: normal turbinates, no congestion or discharge  Lips/Mouth: moist lips and buccal mucosa, non-enlarged tonsils, pink throat  Neck: supple, no JVD, no lymphadenopathy, non-palpable thyroid  Chest/Lungs: clear breath sounds, no wheezing or crackles  Heart: normal rate, regular rhythm, no murmur  Abdomen: soft, non-distended, non-tender, normal bowel sounds, no organomegaly, no masses  Extremities: no focal deformities, no edema  Skin: no active skin lesions    Laboratory/Tests:  No visits with results within 3 Month(s) from this visit.    Latest known visit with results is:   Hospital Outpatient Visit on 07/13/2020   Component Date Value Ref Range Status    Sodium 07/13/2020 139  136 - 145 mmol/L Final    Potassium 07/13/2020 4.0  3.5 - 5.5 mmol/L Final    Chloride 07/13/2020 105  100 - 111 mmol/L Final    CO2 07/13/2020 29  21 - 32 mmol/L Final    Anion gap 07/13/2020 5  3.0 - 18 mmol/L Final    Glucose 07/13/2020 91  74 - 99 mg/dL Final    BUN 07/13/2020 11  7.0 - 18 MG/DL Final    Creatinine 07/13/2020 0.73  0.6 - 1.3 MG/DL Final    BUN/Creatinine ratio 07/13/2020 15  12 - 20   Final    GFR est AA 07/13/2020 >60  >60 ml/min/1.73m2 Final    GFR est non-AA 07/13/2020 >60  >60 ml/min/1.73m2 Final    Comment: (NOTE)  Estimated GFR is calculated using the Modification of Diet in Renal   Disease (MDRD) Study equation, reported for both  Americans   (GFRAA) and non- Americans (GFRNA), and normalized to 1.73m2   body surface area. The physician must decide which value applies to   the patient. The MDRD study equation should only be used in   individuals age 25 or older. It has not been validated for the   following: pregnant women, patients with serious comorbid conditions,   or on certain medications, or persons with extremes of body size,   muscle mass, or nutritional status.  Calcium 07/13/2020 9.0  8.5 - 10.1 MG/DL Final    Bilirubin, total 07/13/2020 0.9  0.2 - 1.0 MG/DL Final    ALT (SGPT) 07/13/2020 19  16 - 61 U/L Final    AST (SGOT) 07/13/2020 11  10 - 38 U/L Final    Alk.  phosphatase 07/13/2020 76  45 - 117 U/L Final    Protein, total 07/13/2020 6.7  6.4 - 8.2 g/dL Final    Albumin 07/13/2020 3.7  3.4 - 5.0 g/dL Final    Globulin 07/13/2020 3.0  2.0 - 4.0 g/dL Final    A-G Ratio 07/13/2020 1.2  0.8 - 1.7   Final    Ventricular Rate 07/13/2020 48  BPM Final    Atrial Rate 07/13/2020 48  BPM Final    P-R Interval 07/13/2020 156  ms Final    QRS Duration 07/13/2020 90  ms Final    Q-T Interval 07/13/2020 444  ms Final    QTC Calculation (Bezet) 07/13/2020 396  ms Final    Calculated P Axis 07/13/2020 41  degrees Final    Calculated R Axis 07/13/2020 29  degrees Final    Calculated T Axis 07/13/2020 40  degrees Final    Diagnosis 07/13/2020    Final                    Value:Marked sinus bradycardia  ST elevation, probably due to early repolarization  Abnormal ECG  No previous ECGs available  Confirmed by Robbie Holcomb ([id number]) on 7/13/2020 7:12:22 PM       Patient has history of bariatric surgery approximately 2 years ago. Patient reports he will receive his second Covid vaccination tomorrow on 12/9/2021. Patient reports he was previously prescribed Pravastatin due to elevated cholesterol levels, but no longer has medication and is unsure of whether it was discontinued by previous provider or he exhausted supply of medication and it was never refilled. Will order lipid panel at this visit. Patient reports she has never had routine colonoscopy and will order at this visit. Physical examination performed:  Bilateral lung sounds clear to auscultation in all fields. Bilateral tympanic membranes visualized during otoscopic examination revealed no abnormalities. Abdomen soft and nontender, bowel sounds normoactive in all 4 quadrants. There is no observed bilateral lower extremity edema. Patient has no complaints of pain or discomfort at this time. Assessment/Plan:    1. Screening for colon cancer. Ordered routine colonoscopy referral at this time. 2.  Essential hypertension. Continue Lisinopril 10 mg tablet for management of essential hypertension. 3.  Mixed hyperlipidemia. Ordered lipid panel for this visit. I have discussed the diagnosis with the patient and the intended plan as seen in the above orders. The patient has received an after-visit summary and questions were answered concerning future plans. I have discussed medication side effects and warnings with the patient as well. I have reviewed the plan of care with the patient, accepted their input and they are in agreement with the treatment goals.        Nataliia Zhao NP  December 8, 2021

## 2021-12-08 NOTE — PROGRESS NOTES
Dano Fleming presents today for   Chief Complaint   Patient presents with   [facility] Establish Care     establish care with provider was a [place] patient        Is someone accompanying this pt? no    Is the patient using any DME equipment during OV? no    Depression Screening:  3 most recent PHQ Screens 12/8/2021   Little interest or pleasure in doing things Not at all   Feeling down, depressed, irritable, or hopeless Not at all   Total Score PHQ 2 0       Learning Assessment:  Learning Assessment 9/9/2020   PRIMARY LEARNER Patient   HIGHEST LEVEL OF EDUCATION - PRIMARY LEARNER  GRADUATED HIGH SCHOOL OR GED   BARRIERS PRIMARY LEARNER NONE   CO-LEARNER CAREGIVER No   PRIMARY LANGUAGE ENGLISH   LEARNER PREFERENCE PRIMARY DEMONSTRATION     -   ANSWERED BY patient   RELATIONSHIP SELF       Fall Risk  No flowsheet data found. ADL  No flowsheet data found. Travel Screening:    Travel Screening     Question   Response    In the last month, have you been in contact with someone who was confirmed or suspected to have Coronavirus / COVID-19? No / Unsure    Have you had a COVID-19 viral test in the last 14 days? No    Do you have any of the following new or worsening symptoms? None of these    Have you traveled internationally or domestically in the last month? No      Travel History   Travel since 11/08/21    No documented travel since 11/08/21         Health Maintenance reviewed and discussed and ordered per Provider. Health Maintenance Due   Topic Date Due    Hepatitis C Screening  Never done    COVID-19 Vaccine (1) Never done    DTaP/Tdap/Td series (1 - Tdap) Never done    Colorectal Cancer Screening Combo  Never done    Lipid Screen  08/23/2019    Shingrix Vaccine Age 50> (1 of 2) Never done    Flu Vaccine (1) Never done   . Coordination of Care:  1. \"Have you been to the ER, urgent care clinic since your last visit? Hospitalized since your last visit? \" No    2.  \"Have you seen or consulted any other health care providers outside of the [address] [place] [address] since your last visit? \" No     3. For patients aged 39-70: Has the patient had a colonoscopy? No     If the patient is female:    4. For patients aged 41-77: Has the patient had a mammogram within the past 2 years? NA based on age or sex    11. For patients aged 21-65: Has the patient had a pap smear? NA based on age or sex.

## 2022-01-07 ENCOUNTER — HOSPITAL ENCOUNTER (OUTPATIENT)
Dept: LAB | Age: 52
Discharge: HOME | End: 2022-01-07
Payer: COMMERCIAL

## 2022-01-07 DIAGNOSIS — E78.2 MIXED HYPERLIPIDEMIA: ICD-10-CM

## 2022-01-07 LAB
25(OH)D3 SERPL-MCNC: 44.6 NG/ML (ref 30–100)
ALBUMIN SERPL-MCNC: 4 G/DL (ref 3.5–4.7)
ALBUMIN/GLOB SERPL: 1.5 {RATIO}
ALP SERPL-CCNC: 58 U/L (ref 38–126)
ALT SERPL-CCNC: 14 U/L (ref 3–72)
ANION GAP SERPL CALC-SCNC: 9 MMOL/L
AST SERPL W P-5'-P-CCNC: 17 U/L (ref 17–74)
BASOPHILS # BLD: 0 K/UL (ref 0–0.1)
BASOPHILS NFR BLD: 1 % (ref 0–2)
BILIRUB SERPL-MCNC: 1.5 MG/DL (ref 0.2–1)
BUN SERPL-MCNC: 12 MG/DL (ref 9–21)
BUN/CREAT SERPL: 17
CA-I BLD-MCNC: 8.9 MG/DL (ref 8.5–10.5)
CHLORIDE SERPL-SCNC: 104 MMOL/L (ref 94–111)
CO2 SERPL-SCNC: 28 MMOL/L (ref 21–33)
CREAT SERPL-MCNC: 0.7 MG/DL (ref 0.8–1.5)
DIFFERENTIAL METHOD BLD: NORMAL
EOSINOPHIL # BLD: 0.2 K/UL (ref 0–0.4)
EOSINOPHIL NFR BLD: 3 % (ref 0–5)
ERYTHROCYTE [DISTWIDTH] IN BLOOD BY AUTOMATED COUNT: 12.2 % (ref 11.6–14.5)
EST. AVERAGE GLUCOSE BLD GHB EST-MCNC: 100 MG/DL
FOLATE SERPL-MCNC: >20 NG/ML (ref 3.1–17.5)
GLOBULIN SER CALC-MCNC: 2.7 G/DL
GLUCOSE SERPL-MCNC: 104 MG/DL (ref 70–110)
HBA1C MFR BLD: 5.1 % (ref 4.2–5.6)
HCT VFR BLD AUTO: 46.4 % (ref 36–48)
HGB BLD-MCNC: 15.7 G/DL (ref 13–16)
IMM GRANULOCYTES # BLD AUTO: 0 K/UL (ref 0–0.04)
IMM GRANULOCYTES NFR BLD AUTO: 0 % (ref 0–0.5)
LYMPHOCYTES # BLD: 1.7 K/UL (ref 0.9–3.6)
LYMPHOCYTES NFR BLD: 32 % (ref 21–52)
MCH RBC QN AUTO: 31.6 PG (ref 24–34)
MCHC RBC AUTO-ENTMCNC: 33.8 G/DL (ref 31–37)
MCV RBC AUTO: 93.4 FL (ref 78–100)
MONOCYTES # BLD: 0.5 K/UL (ref 0.05–1.2)
MONOCYTES NFR BLD: 9 % (ref 3–10)
NEUTS SEG # BLD: 2.9 K/UL (ref 1.8–8)
NEUTS SEG NFR BLD: 55 % (ref 40–73)
NRBC # BLD: 0 K/UL (ref 0–0.01)
NRBC BLD-RTO: 0 PER 100 WBC
PLATELET # BLD AUTO: 205 K/UL (ref 135–420)
PMV BLD AUTO: 10.5 FL (ref 9.2–11.8)
POTASSIUM SERPL-SCNC: 4 MMOL/L (ref 3.2–5.1)
PROT SERPL-MCNC: 6.7 G/DL (ref 6.1–8.4)
PSA SERPL-MCNC: 0.6 NG/ML (ref 0–4)
RBC # BLD AUTO: 4.97 M/UL (ref 4.35–5.65)
SODIUM SERPL-SCNC: 141 MMOL/L (ref 135–145)
VIT B12 SERPL-MCNC: 982 PG/ML (ref 211–911)
WBC # BLD AUTO: 5.4 K/UL (ref 4.6–13.2)

## 2022-01-07 PROCEDURE — 83036 HEMOGLOBIN GLYCOSYLATED A1C: CPT

## 2022-01-07 PROCEDURE — 82306 VITAMIN D 25 HYDROXY: CPT

## 2022-01-07 PROCEDURE — 84153 ASSAY OF PSA TOTAL: CPT

## 2022-01-07 PROCEDURE — 85025 COMPLETE CBC W/AUTO DIFF WBC: CPT

## 2022-01-07 PROCEDURE — 80053 COMPREHEN METABOLIC PANEL: CPT

## 2022-01-07 PROCEDURE — 82607 VITAMIN B-12: CPT

## 2022-01-07 PROCEDURE — 36415 COLL VENOUS BLD VENIPUNCTURE: CPT

## 2022-01-07 PROCEDURE — 86803 HEPATITIS C AB TEST: CPT

## 2022-01-10 LAB
HCV AB SER IA-ACNC: 0.03 INDEX
HCV AB SERPL QL IA: NEGATIVE
HCV COMMENT,HCGAC: NORMAL

## 2022-01-11 ENCOUNTER — OFFICE VISIT (OUTPATIENT)
Dept: FAMILY MEDICINE CLINIC | Age: 52
End: 2022-01-11
Payer: COMMERCIAL

## 2022-01-11 VITALS
BODY MASS INDEX: 33.63 KG/M2 | TEMPERATURE: 98.1 F | SYSTOLIC BLOOD PRESSURE: 116 MMHG | DIASTOLIC BLOOD PRESSURE: 75 MMHG | OXYGEN SATURATION: 93 % | WEIGHT: 248 LBS | HEART RATE: 62 BPM

## 2022-01-11 DIAGNOSIS — I10 ESSENTIAL HYPERTENSION: ICD-10-CM

## 2022-01-11 DIAGNOSIS — E78.2 MIXED HYPERLIPIDEMIA: Primary | ICD-10-CM

## 2022-01-11 PROCEDURE — 99213 OFFICE O/P EST LOW 20 MIN: CPT | Performed by: NURSE PRACTITIONER

## 2022-01-11 RX ORDER — LISINOPRIL 10 MG/1
10 TABLET ORAL DAILY
Qty: 90 TABLET | Refills: 0 | Status: CANCELLED | OUTPATIENT
Start: 2022-01-11

## 2022-01-11 NOTE — PROGRESS NOTES
Ailyn Aleman presents today for   Chief Complaint   Patient presents with    Follow-up     1 month follow up        Is someone accompanying this pt? no    Is the patient using any DME equipment during [address]? no    Depression Screening:  3 most recent PHQ Screens 1/11/2022   Little interest or pleasure in doing things Not at all   Feeling down, depressed, irritable, or hopeless Not at all   Total Score PHQ 2 0       Learning Assessment:  Learning Assessment 9/9/2020   PRIMARY LEARNER Patient   HIGHEST LEVEL OF EDUCATION - PRIMARY LEARNER  GRADUATED HIGH SCHOOL OR GED   BARRIERS PRIMARY LEARNER NONE   CO-LEARNER CAREGIVER No   PRIMARY LANGUAGE ENGLISH   LEARNER PREFERENCE PRIMARY DEMONSTRATION     -   ANSWERED BY patient   RELATIONSHIP SELF       Fall Risk  No flowsheet data found. ADL  No flowsheet data found. Travel Screening:    Travel Screening     Question   Response    In the last month, have you been in contact with someone who was confirmed or suspected to have Coronavirus / COVID-19? No / Unsure    Have you had a COVID-19 viral test in the last 14 days? Yes - Pending result    Do you have any of the following new or worsening symptoms? None of these    Have you traveled internationally or domestically in the last month? No      Travel History   Travel since 12/11/21    No documented travel since 12/11/21         Health Maintenance reviewed and discussed and ordered per Provider. Health Maintenance Due   Topic Date Due    DTaP/Tdap/Td series (1 - Tdap) Never done    Colorectal Cancer Screening Combo  Never done    Lipid Screen  08/23/2019    Shingrix Vaccine Age 50> (1 of 2) Never done    COVID-19 Vaccine (2 - Pfizer 3-dose series) 12/09/2021   . Coordination of Care:  1. \"Have you been to the ER, urgent care clinic since your last visit? Hospitalized since your last visit? \" No    2.  \"Have you seen or consulted any other health care providers outside of the [facility] since your last visit? \" No     3. For patients aged 39-70: Has the patient had a colonoscopy? No     If the patient is female:    4. For patients aged 41-77: Has the patient had a mammogram within the past 2 years? NA based on age or sex    11. For patients aged 21-65: Has the patient had a pap smear?  NA based on age or sex

## 2022-01-11 NOTE — PROGRESS NOTES
History of Present Illness  Ariane See is a 46 y.o. male who presents today for:    Chief Complaint   Patient presents with    Follow-up     1 month follow up      Past Medical History  Past Medical History:   Diagnosis Date    Arthritis     Bronchitis     Hypercholesterolemia     Hypertension     Sleep apnea     cpap        Surgical History  Past Surgical History:   Procedure Laterality Date    HX GI  2019    gastric bypass    HX KNEE ARTHROSCOPY Left     torn meniscus    HX OTHER SURGICAL      wisdom teeth and implanted tooth        Current Medications  Current Outpatient Medications   Medication Sig    calcium carbonate (CALTREX) 600 mg calcium (1,500 mg) tablet Take 600 mg by mouth two (2) times a day.  lisinopriL (PRINIVIL, ZESTRIL) 10 mg tablet Take 1 Tablet by mouth daily.  multivitamin with iron (FLINTSTONES) chewable tablet Take 1 Tab by mouth two (2) times a day.  cyanocobalamin (VITAMIN B-12) 1,000 mcg tablet Take 1,000 mcg by mouth daily.  cholecalciferol (VITAMIN D3) 1,000 unit cap Take 5,000 Units by mouth daily.  thiamine HCL (VITAMIN B-1) 100 mg tablet Take  by mouth daily.  simvastatin (ZOCOR) 20 mg tablet  (Patient not taking: Reported on 2022)     No current facility-administered medications for this visit.        Allergies/Drug Reactions  No Known Allergies     Family History  Family History   Problem Relation Age of Onset    Kidney Disease Mother     Heart Disease Mother     Diabetes Mother     No Known Problems Father         Social History  Social History     Tobacco Use    Smoking status: Former Smoker     Quit date: 1/10/2010     Years since quittin.0    Smokeless tobacco: Never Used   Substance Use Topics    Alcohol use: Not Currently     Comment: occasional    Drug use: No        Health Maintenance   Topic Date Due    DTaP/Tdap/Td series (1 - Tdap) Never done    Colorectal Cancer Screening Combo  Never done    Lipid Screen 08/23/2019    Shingrix Vaccine Age 50> (1 of 2) Never done    Flu Vaccine (1) 06/30/2022 (Originally 9/1/2021)    COVID-19 Vaccine (3 - Booster for Pfizer series) 06/13/2022    Hepatitis C Screening  Completed    Pneumococcal 0-64 years  Aged [employer] History   Administered Date(s) Administered    COVID-19, PFIZER, MRNA, LNP-S, PF, 30MCG/0.3ML DOSE 11/18/2021, 12/13/2021       Physical Exam  Vital signs:   Vitals:    01/11/22 0850   BP: 116/75   Pulse: 62   Temp: 98.1 °F (36.7 °C)   SpO2: 93%   Weight: 248 lb (112.5 kg)     General: alert, oriented, not in distress  Head: scalp normal, atraumatic  Eyes: pupils are equal and reactive, full and intact EOM's  Ears: patent ear canal, intact tympanic membrane  Nose: normal turbinates, no congestion or discharge  Lips/Mouth: moist lips and buccal mucosa, non-enlarged tonsils, pink throat  Neck: supple, no JVD, no lymphadenopathy, non-palpable thyroid  Chest/Lungs: clear breath sounds, no wheezing or crackles  Heart: normal rate, regular rhythm, no murmur  Abdomen: soft, non-distended, non-tender, normal bowel sounds, no organomegaly, no masses  Extremities: no focal deformities, no edema  Skin: no active skin lesions    Laboratory/Tests:  Hospital Outpatient Visit on 01/07/2022   Component Date Value Ref Range Status    WBC 01/07/2022 5.4  4.6 - 13.2 K/uL Final    RBC 01/07/2022 4.97  4.35 - 5.65 M/uL Final    HGB 01/07/2022 15.7  13.0 - 16.0 g/dL Final    HCT 01/07/2022 46.4  36.0 - 48.0 % Final    MCV 01/07/2022 93.4  78.0 - 100.0 FL Final    MCH 01/07/2022 31.6  24.0 - 34.0 PG Final    MCHC 01/07/2022 33.8  31.0 - 37.0 g/dL Final    RDW 01/07/2022 12.2  11.6 - 14.5 % Final    PLATELET 46/40/3633 617  135 - 420 K/uL Final    MPV 01/07/2022 10.5  9.2 - 11.8 FL Final    NRBC 01/07/2022 0.0  0.0  WBC Final    ABSOLUTE NRBC 01/07/2022 0.00  0.00 - 0.01 K/uL Final    NEUTROPHILS 01/07/2022 55  40 - 73 % Final    LYMPHOCYTES 01/07/2022 32  21 - 52 % Final    MONOCYTES 01/07/2022 9  3 - 10 % Final    EOSINOPHILS 01/07/2022 3  0 - 5 % Final    BASOPHILS 01/07/2022 1  0 - 2 % Final    IMMATURE GRANULOCYTES 01/07/2022 0  0 - 0.5 % Final    ABS. NEUTROPHILS 01/07/2022 2.9  1.8 - 8.0 K/UL Final    ABS. LYMPHOCYTES 01/07/2022 1.7  0.9 - 3.6 K/UL Final    ABS. MONOCYTES 01/07/2022 0.5  0.05 - 1.2 K/UL Final    ABS. EOSINOPHILS 01/07/2022 0.2  0.0 - 0.4 K/UL Final    ABS. BASOPHILS 01/07/2022 0.0  0.0 - 0.1 K/UL Final    ABS. IMM. GRANS. 01/07/2022 0.0  0.00 - 0.04 K/UL Final    DF 01/07/2022 AUTOMATED    Final    Vitamin B12 01/07/2022 982* 211 - 911 pg/mL Final    Folate 01/07/2022 >20.0* 3.10 - 17.50 ng/mL Final    Hemoglobin A1c 01/07/2022 5.1  4.2 - 5.6 % Final    Comment: (NOTE)  HbA1C Interpretive Ranges  <5.7              Normal  5.7 - 6.4         Consider Prediabetes  >6.5              Consider Diabetes      Est. average glucose 01/07/2022 100  mg/dL Final    Comment: (NOTE)  The eAG should be interpreted with patient characteristics in mind   since ethnicity, interindividual differences, red cell lifespan,   variation in rates of glycation, etc. may affect the validity of the   calculation.  Hepatitis C virus Ab 01/07/2022 0.03  <0.80 Index Final    Hep C virus Ab Interp. 01/07/2022 Negative  Negative Final    Hep C  virus Ab comment 01/07/2022 Index <0.80. ......................... Lawernce Roughen Negative   Final    Comment: Index > or = to 0.80 and <1.00. .... Lawernce Roughen Lawernce Roughen Equivocal  Index >1.00. ......................... Lawernce Roughen Positive        For Equivocal or Positive results, confirmation with Hepatitis C  RNA by PCR or bDNA is suggested.       Prostate Specific Ag 01/07/2022 0.6  0.0 - 4.0 ng/mL Final    Vitamin D 25-Hydroxy 01/07/2022 44.6  30 - 100 ng/mL Final    Comment: (NOTE)  Deficiency               <20 ng/mL  Insufficiency          20-30 ng/mL  Sufficient             ng/mL  Possible toxicity       >100 ng/mL    The Method used is [employer] currently standardized to a   Center of Disease Control and Prevention (CDC) certified reference   method. Samples containing fluorescein dye can produce falsely   elevated values when tested with the [employer] Centaur Vitamin D Assay. It is recommended that results in the toxic range, >100 ng/mL, be   retested 72 hours post fluorescein exposure.  Sodium 01/07/2022 141  135 - 145 mmol/L Final    Potassium 01/07/2022 4.0  3.2 - 5.1 mmol/L Final    Chloride 01/07/2022 104  94 - 111 mmol/L Final    CO2 01/07/2022 28  21 - 33 mmol/L Final    Anion gap 01/07/2022 9  mmol/L Final    Glucose 01/07/2022 104  70 - 110 mg/dL Final    BUN 01/07/2022 12  9 - 21 mg/dL Final    Creatinine 01/07/2022 0.70* 0.8 - 1.50 mg/dL Final    BUN/Creatinine ratio 01/07/2022 17    Final    GFR est AA 01/07/2022 >60  ml/min/1.73m2 Final    GFR est non-AA 01/07/2022 >60  ml/min/1.73m2 Final    Comment: Estimated GFR is calculated using the IDMS-traceable Modification of Diet in Renal Disease (MDRD) Study equation, reported for both  Americans (GFRAA) and non- Americans (GFRNA), and normalized to 1.73m2 body surface area. The physician must decide which value applies to the patient. The MDRD study equation should only be used in individuals age 25 or older. It has not been validated for the following: pregnant women, patients with serious comorbid conditions, or on certain medications, or persons with extremes of body size, muscle mass, or nutritional status.  Calcium 01/07/2022 8.9  8.5 - 10.5 mg/dL Final    Bilirubin, total 01/07/2022 1.5* 0.2 - 1.0 mg/dL Final    AST (SGOT) 01/07/2022 17  17 - 74 U/L Final    ALT (SGPT) 01/07/2022 14  3 - 72 U/L Final    Alk.  phosphatase 01/07/2022 58  38 - 126 U/L Final    Protein, total 01/07/2022 6.7  6.1 - 8.4 g/dL Final    Albumin 01/07/2022 4.0  3.5 - 4.7 g/dL Final    Globulin 01/07/2022 2.7  g/dL Final    A-G Ratio 01/07/2022 1.5    Final Patient reports no pain or discomfort at this time. Laboratory results discussed in detail with patient during this visit. Lipid panel was not part of ordered laboratory results at last visit. Will order lipid panel at this visit. Physical examination performed:  Bilateral lung sounds clear to auscultation in all fields. Cardiac auscultation revealed no arrhythmias or murmurs. Abdomen soft and nontender, bowel sounds normoactive in all 4 quadrants. There is no observed bilateral lower extremity edema. Assessment/Plan:    1. Mixed hyperlipidemia. Ordered lipid panel for this visit. 2. Essential hypertension. Continue Lisinopril 10 mg tablet daily. I have discussed the diagnosis with the patient and the intended plan as seen in the above orders. The patient has received an after-visit summary and questions were answered concerning future plans. I have discussed medication side effects and warnings with the patient as well. I have reviewed the plan of care with the patient, accepted their input and they are in agreement with the treatment goals.        Mendel Riggs NP  January 11, 2022

## 2022-01-17 ENCOUNTER — HOSPITAL ENCOUNTER (OUTPATIENT)
Dept: LAB | Age: 52
Discharge: HOME | End: 2022-01-17
Payer: COMMERCIAL

## 2022-01-17 DIAGNOSIS — E78.2 MIXED HYPERLIPIDEMIA: ICD-10-CM

## 2022-01-17 LAB
CHOLEST SERPL-MCNC: 185 MG/DL
HDLC SERPL-MCNC: 77 MG/DL (ref 40–60)
HDLC SERPL: 2.4 {RATIO} (ref 0–5)
LDLC SERPL CALC-MCNC: 86.8 MG/DL (ref 0–100)
LIPID PROFILE,FLP: ABNORMAL
TRIGL SERPL-MCNC: 106 MG/DL (ref ?–150)
VLDLC SERPL CALC-MCNC: 21.2 MG/DL

## 2022-01-17 PROCEDURE — 80061 LIPID PANEL: CPT

## 2022-01-17 PROCEDURE — 36415 COLL VENOUS BLD VENIPUNCTURE: CPT

## 2022-03-03 ENCOUNTER — OFFICE VISIT (OUTPATIENT)
Dept: GASTROENTEROLOGY | Age: 52
End: 2022-03-03
Payer: COMMERCIAL

## 2022-03-03 VITALS
HEART RATE: 74 BPM | SYSTOLIC BLOOD PRESSURE: 122 MMHG | WEIGHT: 248 LBS | DIASTOLIC BLOOD PRESSURE: 72 MMHG | BODY MASS INDEX: 33.63 KG/M2

## 2022-03-03 DIAGNOSIS — E78.00 HYPERCHOLESTEROLEMIA: ICD-10-CM

## 2022-03-03 DIAGNOSIS — I10 PRIMARY HYPERTENSION: ICD-10-CM

## 2022-03-03 DIAGNOSIS — Z12.11 COLON CANCER SCREENING: ICD-10-CM

## 2022-03-03 DIAGNOSIS — E66.09 CLASS 2 OBESITY DUE TO EXCESS CALORIES WITHOUT SERIOUS COMORBIDITY IN ADULT, UNSPECIFIED BMI: Primary | ICD-10-CM

## 2022-03-03 PROCEDURE — 99202 OFFICE O/P NEW SF 15 MIN: CPT | Performed by: INTERNAL MEDICINE

## 2022-03-03 NOTE — PROGRESS NOTES
Referring Physician:  Jacy Cazares NP     Chief Complaint:  Colon cancer screening    Date of service: 03/03/22     Subjective:     History of Present Illness:  Patient is a male [address] [facility] 46 y.o.  who is seen for evaluation for colon cancer screening. The patient denies nausea, vomiting, fever, chills, abdominal pain, reflux, dysphagia, change in bowel habits, diarrhea, constipation, weight loss, rectal bleeding, or melena. Last EGD- never. Last colonoscopy - never. Family history for GI disease is significant for no GI or liver disease. The patient reports liver related risk factors including obesity. Past medical history is significant for cholesterolemia, sleep apnea, hypertension, bronchitis, obesity with weight of 248 pounds. S/P gastric bypass 4 years and has lost 85 lbs. Since then. PMH:  Past Medical History:   Diagnosis Date    Arthritis     Bronchitis     Hypercholesterolemia     Hypertension     Sleep apnea     cpap        PSH:  Past Surgical History:   Procedure Laterality Date    HX GI  04/30/2019    gastric bypass    HX KNEE ARTHROSCOPY Left     torn meniscus    HX OTHER SURGICAL      wisdom teeth and implanted tooth        Allergies:  No Known Allergies     Home Medications:  Cannot display prior to admission medications because the patient has not been admitted in this contact. Hospital Medications:  Current Outpatient Medications   Medication Sig    lisinopriL (PRINIVIL, ZESTRIL) 10 mg tablet take 1 tablet by mouth once daily    calcium carbonate (CALTREX) 600 mg calcium (1,500 mg) tablet Take 600 mg by mouth two (2) times a day.  multivitamin with iron (FLINTSTONES) chewable tablet Take 1 Tab by mouth two (2) times a day.  cyanocobalamin (VITAMIN B-12) 1,000 mcg tablet Take 1,000 mcg by mouth every other day.  cholecalciferol (VITAMIN D3) 1,000 unit cap Take 5,000 Units by mouth daily.     thiamine HCL (VITAMIN B-1) 100 mg tablet Take  by mouth daily.  simvastatin (ZOCOR) 20 mg tablet  (Patient not taking: Reported on 2022)     No current facility-administered medications for this visit. Social History:  Social History     Tobacco Use    Smoking status: Former Smoker     Quit date: 1/10/2010     Years since quittin.1    Smokeless tobacco: Never Used   Substance Use Topics    Alcohol use: Not Currently     Comment: occasional        Pt denies any history of IV drug use, blood transfusions. Family History:  Family History   Problem Relation Age of Onset    Kidney Disease Mother     Heart Disease Mother     Diabetes Mother     No Known Problems Father         Review of Systems:  A detailed 10 system ROS is obtained, with pertinent positives as listed above. All others are negative unless listed in history above. Constitutional denies fever chills, headache, or weight loss. Skin- denies lesions or rashes. HEENT denies any vision or hearing problems, epistaxis, sore throat, or dental problems. Lungs- no shortness of breath or chest pain reported, no dyspnea on exertion. Cardiac- no palpitations or chest pain reported, including at rest or on exertion. GIno abdominal pain, melena, rectal bleeding, reflux, dysphagia, jaundice, change in stool or urine color, constipation or diarrhea. Genitourinary no dysuria or hematuria. Musculoskeletalno muscle weakness or disuse or atrophy. Neurologicno numbness, tingling, gait disturbance, or other abnormalities. Rheumatologic- patient denies any immune or rheumatologic diseases or symptoms. Endocrine patient denies any endocrine abnormalities including thyroid disease or diabetes. Psychologicpatient denies depression, anxiety or emotional issues. No reported memory issues.         Objective:     Physical Exam:  Vitals: /72 (BP 1 Location: Right arm, BP Patient Position: Sitting)   Pulse 74   Wt 112.5 kg (248 lb)   BMI 33.63 kg/m²    Gen:  Pt is alert, cooperative, no acute distress  Skin:  Extremities and face reveal no rashes. No delarosa erythema. No telangiectasias on the chest wall. HEENT: Sclerae anicteric. Extra-occular muscles are intact. No oral ulcers. No abnormal pigmentation of the lips. The neck is supple. Cardiovascular: Regular rate and rhythm. No murmurs, gallops, or rubs. Respiratory:  Comfortable breathing with no accessory muscle use. Clear breath sounds anteriorly with no wheezes, rales, or rhonchi. GI:  Abdomen nondistended, soft, and nontender. Normal active bowel sounds. No enlargement of the liver or spleen. No masses palpable. Rectal:  Deferred  Musculoskeletal:   No costovertebral tenderness. No localized muscle weakness, or decreased range of motion. Extremities:  No palpable cords or pitting edema of the lower legs. Extremities have good range of motion. Neurological:  Gross memory appears intact. Patient is alert and oriented. Psychiatric:  Mood appears appropriate with judgement intact. Lymphatic:  No cervical or supraclavicular adenopathy.     Laboratory:        Lab Results   Component Value Date     01/07/2022    K 4.0 01/07/2022     01/07/2022    CO2 28 01/07/2022    AGAP 9 01/07/2022     01/07/2022    BUN 12 01/07/2022    CREA 0.70 (L) 01/07/2022    BUCR 17 01/07/2022    GFRAA >60 01/07/2022    GFRNA >60 01/07/2022    CA 8.9 01/07/2022    TBILI 1.5 (H) 01/07/2022    AST 17 01/07/2022    ALT 14 01/07/2022    AP 58 01/07/2022    TP 6.7 01/07/2022    ALB 4.0 01/07/2022    GLOB 2.7 01/07/2022    AGRAT 1.5 01/07/2022    WBC 5.4 01/07/2022    RBC 4.97 01/07/2022    HGB 15.7 01/07/2022    HCT 46.4 01/07/2022    MCV 93.4 01/07/2022    MCH 31.6 01/07/2022    MCHC 33.8 01/07/2022    RDW 12.2 01/07/2022     01/07/2022    MPLV 10.5 01/07/2022    GRANS 55 01/07/2022    LYMPH 32 01/07/2022    MONOS 9 01/07/2022    EOS 3 01/07/2022    BASOS 1 01/07/2022    IG 0 01/07/2022    ANEU 2.9 01/07/2022 ABL 1.7 01/07/2022    ABM 0.5 01/07/2022    NEERAJ 0.2 01/07/2022    ABB 0.0 01/07/2022    AIG 0.0 01/07/2022   results  Lab Results   Component Value Date     01/07/2022    K 4.0 01/07/2022     01/07/2022    CO2 28 01/07/2022    AGAP 9 01/07/2022     01/07/2022    BUN 12 01/07/2022    CREA 0.70 (L) 01/07/2022    BUCR 17 01/07/2022    GFRAA >60 01/07/2022    GFRNA >60 01/07/2022    CA 8.9 01/07/2022    TBILI 1.5 (H) 01/07/2022    AST 17 01/07/2022    ALT 14 01/07/2022    AP 58 01/07/2022    TP 6.7 01/07/2022    ALB 4.0 01/07/2022    GLOB 2.7 01/07/2022    AGRAT 1.5 01/07/2022    WBC 5.4 01/07/2022    RBC 4.97 01/07/2022    HGB 15.7 01/07/2022    HCT 46.4 01/07/2022    MCV 93.4 01/07/2022    MCH 31.6 01/07/2022    MCHC 33.8 01/07/2022    RDW 12.2 01/07/2022     01/07/2022    MPLV 10.5 01/07/2022    GRANS 55 01/07/2022    LYMPH 32 01/07/2022    MONOS 9 01/07/2022    EOS 3 01/07/2022    BASOS 1 01/07/2022    IG 0 01/07/2022    ANEU 2.9 01/07/2022    ABL 1.7 01/07/2022    ABM 0.5 01/07/2022    NEERAJ 0.2 01/07/2022    ABB 0.0 01/07/2022    AIG 0.0 01/07/2022        CT scan of abdomen and pelvis - No results  CT Results (most recent):  No results found for this or any previous visit. Abdominal US- No results  US Results (most recent):  No results found for this or any previous visit. MRI and MRCP- No results  MRI Results (most recent):  Results from Hospital Encounter encounter on 05/13/17    MRI KNEE LT WO CONT    Narrative  Multisequence multiplanar MR images of the left knee were obtained. Truncation of the medial meniscus from previous surgery, especially along the  inner edge of the meniscal body. No definitive new tear. Severe loss of the  medial compartment cartilage with mild subchondral edema along the medial tibial  plateau. Medial collateral ligament is intact. Cruciate ligaments are intact.     Lateral meniscus, lateral compartment cartilage and lateral collateral ligament  complex are intact. No joint effusion. Patellar cartilage and retinacula are intact. Mild medial  subcutaneous edema noted. Extensor mechanism is intact. There is a moderately large lobulated popliteal cyst. The largest lateral  component measures approximately 2.7 x 1.8 cm in transverse and AP dimensions,  approximately 6 cm in length, extending into the distal thigh. Impression  IMPRESSION:  1. Attenuated medial meniscus from previous surgery. No definite new meniscal  tear. Degenerative joint disease in medial compartment with subchondral edema in  the tibial plateau. 2. Large lobulated popliteal cyst as described. No surrounding inflammation. Assessment:     1. Obesity. Weight is 248 pounds. S/P gastric bypass with 85 lbs. weight loss. 2.  Hypertension. Is on oral medication for this problem. 3.  Hypercholesterolemia. Is on oral medication for this problem. 4.  Colon cancer screening. He is due for screening colonoscopy given his age of 39 to exclude polyps and malignancy. Plan:     1. Colonoscopy with MAC. Bowel prep magnesium citrate. I discussed the techniques involved with the procedure as well as the risks, benefits, and alternatives including but not limited to bleeding, infection, perforation requiring emergent surgery, missing lesions, death, and anesthesia related complications with the patient. All questions and concerns were answered. The patient voiced understanding and agrees to proceed. 2. Further recommendations pending the patient's clinical course, if needed. 3. The patient will follow up with me as needed.           Danetta Burkitt, MD

## 2022-03-03 NOTE — PROGRESS NOTES
Queen Leana presents today for   Chief Complaint   Patient presents with    New Patient     colon screening (never had a colonscopy before)        Is someone accompanying this pt? no    Is the patient using any DME equipment during [address]? no    Depression Screening:  3 most recent PHQ Screens 3/3/2022   Little interest or pleasure in doing things Not at all   Feeling down, depressed, irritable, or hopeless Not at all   Total Score PHQ 2 0       Learning Assessment:  Learning Assessment 9/9/2020   PRIMARY LEARNER Patient   HIGHEST LEVEL OF EDUCATION - PRIMARY LEARNER  GRADUATED [address] PRIMARY LEARNER NONE   CO-LEARNER CAREGIVER No   PRIMARY LANGUAGE ENGLISH   LEARNER PREFERENCE PRIMARY DEMONSTRATION     -   ANSWERED BY patient   RELATIONSHIP SELF       Fall Risk  No flowsheet data found. Coordination of Care:  1. Have you been to the ER, urgent care clinic since your last visit? Hospitalized since your last visit? no    2. Have you seen or consulted any other health care providers outside of the [address] [place] [address] since your last visit? Include any pap smears or colon screening.  Yes, PCP Heron Alejandro

## 2022-03-18 PROBLEM — E66.01 MORBID OBESITY WITH BMI OF 45.0-49.9, ADULT (HCC): Status: ACTIVE | Noted: 2018-01-10

## 2022-03-18 PROBLEM — G47.33 OSA ON CPAP: Status: ACTIVE | Noted: 2018-01-10

## 2022-03-18 PROBLEM — Z99.89 OSA ON CPAP: Status: ACTIVE | Noted: 2018-01-10

## 2022-03-19 PROBLEM — I10 ESSENTIAL HYPERTENSION: Status: ACTIVE | Noted: 2018-01-10

## 2022-03-19 PROBLEM — E78.2 MIXED HYPERLIPIDEMIA: Status: ACTIVE | Noted: 2018-01-10

## 2022-03-19 PROBLEM — E66.01 MORBID OBESITY (HCC): Status: ACTIVE | Noted: 2019-04-30

## 2022-03-21 ENCOUNTER — DOCUMENTATION ONLY (OUTPATIENT)
Dept: SURGERY | Age: 52
End: 2022-03-21

## 2022-03-21 NOTE — PROGRESS NOTES
Per [facility] requirements;  E-mail and letter sent for follow up appointment. [employer] Alexys Loss Cassie Pyle 89      Dear Patient,    Your health is our main concern. It is important for your health to have follow-up lab work and to see your surgeon at 3 months, 6 months and annually after your weight loss surgery. Additionally, the Department of bariatric Surgery at our hospital is a member of the Metabolic and Bariatric Surgery Accreditation and Quality Improvement Program [facility]). As a participant in this program, we gather information on the outcomes of our patients after surgery. Please call the office for a follow up appointment at [phone] [facility]) or [phone] [facility]). If you have moved out of the area or have changed surgeons please call us and let us know the name of your doctor. Your health and feedback are important to us. We greatly appreciate your response.        Thank you,  [employer] Alexys Loss [address] [facility]

## 2022-03-29 ENCOUNTER — ANESTHESIA EVENT (OUTPATIENT)
Dept: ENDOSCOPY | Age: 52
End: 2022-03-29
Payer: COMMERCIAL

## 2022-03-29 RX ORDER — DEXTROSE 50 % IN WATER (D50W) INTRAVENOUS SYRINGE
25-50 AS NEEDED
Status: CANCELLED | OUTPATIENT
Start: 2022-03-29

## 2022-03-29 RX ORDER — MAGNESIUM SULFATE 100 %
4 CRYSTALS MISCELLANEOUS AS NEEDED
Status: CANCELLED | OUTPATIENT
Start: 2022-03-29

## 2022-04-05 ENCOUNTER — ANESTHESIA (OUTPATIENT)
Dept: ENDOSCOPY | Age: 52
End: 2022-04-05
Payer: COMMERCIAL

## 2022-05-10 ENCOUNTER — HOSPITAL ENCOUNTER (OUTPATIENT)
Age: 52
Setting detail: OUTPATIENT SURGERY
Discharge: HOME | End: 2022-05-10
Attending: INTERNAL MEDICINE | Admitting: INTERNAL MEDICINE
Payer: COMMERCIAL

## 2022-05-10 VITALS
OXYGEN SATURATION: 96 % | BODY MASS INDEX: 33.18 KG/M2 | WEIGHT: 245 LBS | TEMPERATURE: 97.4 F | HEIGHT: 72 IN | SYSTOLIC BLOOD PRESSURE: 104 MMHG | DIASTOLIC BLOOD PRESSURE: 58 MMHG | RESPIRATION RATE: 14 BRPM | HEART RATE: 49 BPM

## 2022-05-10 PROCEDURE — 77030037186 HC VLV ENDOSC STRL DEFENDO DISP MVAT -A: Performed by: INTERNAL MEDICINE

## 2022-05-10 PROCEDURE — 76060000032 HC ANESTHESIA 0.5 TO 1 HR: Performed by: INTERNAL MEDICINE

## 2022-05-10 PROCEDURE — 74011250636 HC RX REV CODE- 250/636: Performed by: NURSE ANESTHETIST, CERTIFIED REGISTERED

## 2022-05-10 PROCEDURE — 76040000007: Performed by: INTERNAL MEDICINE

## 2022-05-10 PROCEDURE — 45380 COLONOSCOPY AND BIOPSY: CPT | Performed by: INTERNAL MEDICINE

## 2022-05-10 PROCEDURE — 77030018831 HC SOL IRR H20 BAXT -A: Performed by: INTERNAL MEDICINE

## 2022-05-10 PROCEDURE — 77030009426 HC FCPS BIOP ENDOSC BSC -B: Performed by: INTERNAL MEDICINE

## 2022-05-10 PROCEDURE — 2709999900 HC NON-CHARGEABLE SUPPLY: Performed by: INTERNAL MEDICINE

## 2022-05-10 PROCEDURE — 74011000250 HC RX REV CODE- 250: Performed by: NURSE ANESTHETIST, CERTIFIED REGISTERED

## 2022-05-10 PROCEDURE — 77030042138 HC TBNG SPECIAL -A: Performed by: INTERNAL MEDICINE

## 2022-05-10 PROCEDURE — 88305 TISSUE EXAM BY PATHOLOGIST: CPT

## 2022-05-10 RX ORDER — SODIUM CHLORIDE 0.9 % (FLUSH) 0.9 %
5-40 SYRINGE (ML) INJECTION AS NEEDED
Status: CANCELLED | OUTPATIENT
Start: 2022-05-10

## 2022-05-10 RX ORDER — FLUMAZENIL 0.1 MG/ML
0.2 INJECTION INTRAVENOUS
Status: CANCELLED | OUTPATIENT
Start: 2022-05-10

## 2022-05-10 RX ORDER — DIPHENHYDRAMINE HYDROCHLORIDE 50 MG/ML
12.5 INJECTION, SOLUTION INTRAMUSCULAR; INTRAVENOUS
Status: CANCELLED | OUTPATIENT
Start: 2022-05-10

## 2022-05-10 RX ORDER — SODIUM CHLORIDE, SODIUM LACTATE, POTASSIUM CHLORIDE, CALCIUM CHLORIDE 600; 310; 30; 20 MG/100ML; MG/100ML; MG/100ML; MG/100ML
25 INJECTION, SOLUTION INTRAVENOUS CONTINUOUS
Status: DISCONTINUED | OUTPATIENT
Start: 2022-05-10 | End: 2022-05-10 | Stop reason: HOSPADM

## 2022-05-10 RX ORDER — FENTANYL CITRATE 50 UG/ML
50 INJECTION, SOLUTION INTRAMUSCULAR; INTRAVENOUS AS NEEDED
Status: CANCELLED | OUTPATIENT
Start: 2022-05-10

## 2022-05-10 RX ORDER — SODIUM CHLORIDE 0.9 % (FLUSH) 0.9 %
5-40 SYRINGE (ML) INJECTION EVERY 8 HOURS
Status: DISCONTINUED | OUTPATIENT
Start: 2022-05-10 | End: 2022-05-10 | Stop reason: HOSPADM

## 2022-05-10 RX ORDER — SODIUM CHLORIDE 0.9 % (FLUSH) 0.9 %
5-40 SYRINGE (ML) INJECTION AS NEEDED
Status: DISCONTINUED | OUTPATIENT
Start: 2022-05-10 | End: 2022-05-10 | Stop reason: HOSPADM

## 2022-05-10 RX ORDER — GLYCOPYRROLATE 0.2 MG/ML
INJECTION INTRAMUSCULAR; INTRAVENOUS AS NEEDED
Status: DISCONTINUED | OUTPATIENT
Start: 2022-05-10 | End: 2022-05-10 | Stop reason: HOSPADM

## 2022-05-10 RX ORDER — PROPOFOL 10 MG/ML
INJECTION, EMULSION INTRAVENOUS AS NEEDED
Status: DISCONTINUED | OUTPATIENT
Start: 2022-05-10 | End: 2022-05-10 | Stop reason: HOSPADM

## 2022-05-10 RX ORDER — NALOXONE HYDROCHLORIDE 0.4 MG/ML
0.1 INJECTION, SOLUTION INTRAMUSCULAR; INTRAVENOUS; SUBCUTANEOUS
Status: CANCELLED | OUTPATIENT
Start: 2022-05-10

## 2022-05-10 RX ORDER — ONDANSETRON 2 MG/ML
4 INJECTION INTRAMUSCULAR; INTRAVENOUS ONCE
Status: CANCELLED | OUTPATIENT
Start: 2022-05-10 | End: 2022-05-10

## 2022-05-10 RX ORDER — LIDOCAINE HYDROCHLORIDE 20 MG/ML
INJECTION, SOLUTION EPIDURAL; INFILTRATION; INTRACAUDAL; PERINEURAL AS NEEDED
Status: DISCONTINUED | OUTPATIENT
Start: 2022-05-10 | End: 2022-05-10 | Stop reason: HOSPADM

## 2022-05-10 RX ORDER — SODIUM CHLORIDE, SODIUM LACTATE, POTASSIUM CHLORIDE, CALCIUM CHLORIDE 600; 310; 30; 20 MG/100ML; MG/100ML; MG/100ML; MG/100ML
25 INJECTION, SOLUTION INTRAVENOUS CONTINUOUS
Status: CANCELLED | OUTPATIENT
Start: 2022-05-10 | End: 2022-05-10

## 2022-05-10 RX ADMIN — PROPOFOL 50 MG: 10 INJECTION, EMULSION INTRAVENOUS at 09:02

## 2022-05-10 RX ADMIN — LIDOCAINE HYDROCHLORIDE 40 MG: 20 INJECTION, SOLUTION EPIDURAL; INFILTRATION; INTRACAUDAL; PERINEURAL at 09:01

## 2022-05-10 RX ADMIN — SODIUM CHLORIDE, POTASSIUM CHLORIDE, SODIUM LACTATE AND CALCIUM CHLORIDE 25 ML/HR: 600; 310; 30; 20 INJECTION, SOLUTION INTRAVENOUS at 07:46

## 2022-05-10 RX ADMIN — PROPOFOL 50 MG: 10 INJECTION, EMULSION INTRAVENOUS at 09:12

## 2022-05-10 RX ADMIN — PROPOFOL 50 MG: 10 INJECTION, EMULSION INTRAVENOUS at 09:08

## 2022-05-10 RX ADMIN — PROPOFOL 50 MG: 10 INJECTION, EMULSION INTRAVENOUS at 09:10

## 2022-05-10 RX ADMIN — GLYCOPYRROLATE 0.2 MG: 0.2 INJECTION, SOLUTION INTRAMUSCULAR; INTRAVENOUS at 08:54

## 2022-05-10 RX ADMIN — PROPOFOL 100 MG: 10 INJECTION, EMULSION INTRAVENOUS at 09:01

## 2022-05-10 RX ADMIN — PROPOFOL 50 MG: 10 INJECTION, EMULSION INTRAVENOUS at 09:03

## 2022-05-10 RX ADMIN — PROPOFOL 50 MG: 10 INJECTION, EMULSION INTRAVENOUS at 09:06

## 2022-05-10 NOTE — PROCEDURES
Colonoscopy procedure note    Date of service: 5/10/2022    Type: Screening    Indication for procedure: Colon cancer screening    Anesthesia classification: ASA class 2    Patient history and physical been accomplished and documented. Patient is assessed and determined to be appropriate candidate for planned procedure and sedation; patient reassessed immediately prior to sedation. Sedation plan: MAC per anesthesia    Surgical assistant: Not applicable    Airway assessment: Range of motion: Normal, mouth opening, Visual obstruction: No.    UPDATED PREOP EXAM:  Unchanged. VS: Reviewed  Gen: in NAD  CV: RRR, no murmur  Resp: CTA  Abd: Soft, NTND, +BS  Extrem: No cyanosis or edema  Neuro: Awake and alert    Informed consent obtained: Yes. The indications, risks including but not limited to bleeding, perforation, infection, death, and potential failure to visual areas are diagnosed neoplasia, alternatives and benefits were discussed with the patient prior to the procedure. Patient identity and procedure was verified, absent was obtained, and is consistent with the consent form found in the patient's records. PROCEDURE PERFORMED:  COLONOSCOPY  to the cecum with MAC and forcep polypectomy of a sending and transverse sessile polyps. INSTRUMENT: Olympus colonoscope per nursing notes. FINDINGS:    External anal lesions: Normal   Rectum: normal.   Retroflexion view: Grade 2 internal hemorrhoids  Sigmoid: normal except for diverticulosis  Descending Colon: normal   Transverse Colon: normal except for a small sessile polyp removed by forcep polypectomy without incident. Ascending Colon: normal except for a small sessile polyp removed by forcep polypectomy without incident. Cecum: normal   Terminal ileum: not evaluated     Specimens: 1. Forcep polypectomy of ascending colon sessile polyp. 2.  Forcep polypectomy of transverse colon sessile polyp.     Bowel preparation- adequate to detect small (5mm) polyps or larger. Estimated blood loss: none   Complications:  none   Cecal withdrawal time: 9 minutes. Comments:  none     Impression:  1. 2 small sessile polyps removed from the ascending and transverse colon respectively by forcep polypectomy. 2. Sigmoid diverticulosis. 3. Grade 2 internal hemorrhoids. Recommendations:  1. Check pathology. Will notify patient with results when they are available. 2. Repeat colonoscopy in  5 years for surveillance. 3. Follow up as needed.

## 2022-05-10 NOTE — ANESTHESIA POSTPROCEDURE EVALUATION
Procedure(s):  COLONOSCOPY and polypectomy. general, total IV anesthesia    Anesthesia Post Evaluation      Multimodal analgesia: multimodal analgesia used between 6 hours prior to anesthesia start to PACU discharge  Patient location during evaluation: PACU  Patient participation: complete - patient participated  Level of consciousness: awake and alert  Pain management: adequate  Airway patency: patent  Anesthetic complications: no  Cardiovascular status: acceptable  Respiratory status: acceptable  Hydration status: acceptable  Comments: Ok to discharge when standard criteria are met. Post anesthesia nausea and vomiting:  none  Final Post Anesthesia Temperature Assessment:  Normothermia (36.0-37.5 degrees C)      INITIAL Post-op Vital signs: No vitals data found for the desired time range.

## 2022-05-10 NOTE — H&P
Date of Surgery Update:  May Coral was seen and examined. History and physical has been reviewed. The patient has been examined.  There have been no significant clinical changes since the completion of the originally dated History and Physical.    Signed By: Veronica Luevano MD     May 10, 2022 8:49 AM

## 2022-05-10 NOTE — ANESTHESIA PREPROCEDURE EVALUATION
Relevant Problems   No relevant active problems       Anesthetic History   No history of anesthetic complications            Review of Systems / Medical History  Patient summary reviewed, nursing notes reviewed and pertinent labs reviewed    Pulmonary        Sleep apnea: CPAP           Neuro/Psych   Within defined limits           Cardiovascular    Hypertension          Hyperlipidemia    Exercise tolerance: >4 METS     GI/Hepatic/Renal  Within defined limits             Comments: S/p gastric bypass Endo/Other        Obesity and arthritis     Other Findings              Physical Exam    Airway  Mallampati: III  TM Distance: 4 - 6 cm  Neck ROM: normal range of motion   Mouth opening: Normal     Cardiovascular  Regular rate and rhythm,  S1 and S2 normal,  no murmur, click, rub, or gallop  Rhythm: regular  Rate: normal         Dental  No notable dental hx       Pulmonary  Breath sounds clear to auscultation               Abdominal  GI exam deferred       Other Findings            Anesthetic Plan    ASA: 2  Anesthesia type: general and total IV anesthesia          Induction: Intravenous  Anesthetic plan and risks discussed with: Patient and Spouse
none

## 2022-09-29 RX ORDER — LISINOPRIL 10 MG/1
10 TABLET ORAL DAILY
Qty: 90 TABLET | Refills: 1 | Status: SHIPPED | OUTPATIENT
Start: 2022-09-29

## 2022-11-10 ENCOUNTER — OFFICE VISIT (OUTPATIENT)
Dept: FAMILY MEDICINE CLINIC | Age: 52
End: 2022-11-10
Payer: COMMERCIAL

## 2022-11-10 VITALS
WEIGHT: 257.2 LBS | BODY MASS INDEX: 34.84 KG/M2 | HEART RATE: 53 BPM | HEIGHT: 72 IN | OXYGEN SATURATION: 96 % | SYSTOLIC BLOOD PRESSURE: 117 MMHG | DIASTOLIC BLOOD PRESSURE: 78 MMHG | RESPIRATION RATE: 18 BRPM

## 2022-11-10 DIAGNOSIS — M54.50 RIGHT LUMBAR PAIN: Primary | ICD-10-CM

## 2022-11-10 PROCEDURE — 3074F SYST BP LT 130 MM HG: CPT | Performed by: NURSE PRACTITIONER

## 2022-11-10 PROCEDURE — 99213 OFFICE O/P EST LOW 20 MIN: CPT | Performed by: NURSE PRACTITIONER

## 2022-11-10 PROCEDURE — 3078F DIAST BP <80 MM HG: CPT | Performed by: NURSE PRACTITIONER

## 2022-11-10 RX ORDER — CYCLOBENZAPRINE HCL 5 MG
5 TABLET ORAL
Qty: 28 TABLET | Refills: 0 | Status: SHIPPED | OUTPATIENT
Start: 2022-11-10 | End: 2022-11-24

## 2022-11-10 NOTE — PROGRESS NOTES
Sophy Crawford (: 1970) is a 46 y.o. male, established patient, here for evaluation of the following chief complaint(s):  LOW BACK PAIN (Lower right back pain. Pt reports CHELSEA as of this past Tuesday. PT declines tdap and flu . No imaging at this time)       ASSESSMENT/PLAN:  Below is the assessment and plan developed based on review of pertinent history, physical exam, labs, studies, and medications. 1. Right lumbar pain  Ibuprofen 200 mg 3-4 tabs every 6 hours as needed, will purchase over th counter  Can continue to use heating pad as needed  Discussed muscle pain can take several weeks before returning to normal. Notify the office should he need a work note. -     cyclobenzaprine (FLEXERIL) 5 mg tablet; Take 1 Tablet by mouth three (3) times daily as needed for Muscle Spasm(s) for up to 14 days. Indications: muscle spasm, Normal, Disp-28 Tablet, R-0    No follow-ups on file. SUBJECTIVE/OBJECTIVE:  HPI  46year old male who presents with acute right sided lower back pain that started two days ago after moving some furniture. He has been using a heating pad, Tylenol, and tried a lidoderm patch without any relief. Review of Systems   Musculoskeletal:  Positive for back pain. All other systems reviewed and are negative. Physical Exam  Vitals reviewed. HENT:      Head: Normocephalic. Nose: Nose normal.   Cardiovascular:      Rate and Rhythm: Normal rate and regular rhythm. Pulses: Normal pulses. Heart sounds: Normal heart sounds. Pulmonary:      Effort: Pulmonary effort is normal.      Breath sounds: Normal breath sounds. Abdominal:      General: Bowel sounds are normal.      Palpations: Abdomen is soft. Musculoskeletal:      Lumbar back: Spasms and tenderness present. Back:    Skin:     General: Skin is warm. Neurological:      Mental Status: He is alert and oriented to person, place, and time.        Visit Vitals  /78   Pulse (!) 53   Resp 18   Ht 6' (1.829 m)   Wt 257 lb 3.2 oz (116.7 kg)   SpO2 96%   BMI 34.88 kg/m²           An electronic signature was used to authenticate this note.   -- [address] [place] [address], NP-C

## 2022-11-10 NOTE — PROGRESS NOTES
Stuart Hu presents today for   Chief Complaint   Patient presents with    LOW BACK PAIN     Lower right back pain. Pt reports CHELSEA as of this past Tuesday. PT declines tdap and flu . No imaging at this time       Is someone accompanying this pt? No, NO (name) NO (relationship) (temp NO)    Is the patient using any DME equipment during OV? NO    Depression Screening:  3 most recent PHQ Screens 11/10/2022   Little interest or pleasure in doing things Not at all   Feeling down, depressed, irritable, or hopeless Not at all   Total Score PHQ 2 0       Learning Assessment:  Learning Assessment 9/9/2020   PRIMARY LEARNER Patient   HIGHEST LEVEL OF EDUCATION - PRIMARY LEARNER  GRADUATED HIGH SCHOOL OR GED   BARRIERS PRIMARY LEARNER NONE   CO-LEARNER CAREGIVER No   PRIMARY LANGUAGE ENGLISH   LEARNER PREFERENCE PRIMARY DEMONSTRATION     -   ANSWERED BY patient   RELATIONSHIP SELF         Health Maintenance reviewed and discussed and ordered per Provider. Health Maintenance Due   Topic Date Due    Shingrix Vaccine Age 49> (1 of 2) Never done    COVID-19 Vaccine (3 - Booster for Ordaz Peter series) 02/07/2022   . Coordination of Care:  1. \"Have you been to the ER, urgent care clinic since your last visit? Hospitalized since your last visit? \" No    2. \"Have you seen or consulted any other health care providers outside of the [address] [place] [address] since your last visit? \" No     3. For patients aged 39-70: Has the patient had a colonoscopy? Yes - no Care Gap present     If the patient is female:    4. For patients aged 41-77: Has the patient had a mammogram within the past 2 years? No    5. For patients aged 21-65: Has the patient had a pap smear?  No

## 2023-02-10 NOTE — PROGRESS NOTES
Attended pre op class. Patient was educated on instructions below. Patient was provided a copy and all instructions were understood. Patient  was provided a copy and instructed to call with any questions. Patient was provided phone number to call. Patient verbalized understanding. Reviewed Lovenox  7-Day Preoperative Liquid Diet  Benefits:  ? Reducing intake before surgery will shrink  your liver by depleting glycogen (a form of  stored energy). ? Reduced liver size gives better access to  stomach during surgery, which translates  to a safer surgery. ? Prevents the ?last supper? syndrome. Attended pre op class. Patient was educated on instructions below. Patient was provided a copy and all instructions were understood. Patient  was provided a copy and instructed to call with any questions. Patient was provided phone number to call. Patient verbalized understanding. ? Experiencing weight loss before the  procedure encourages postoperative  compliance and ?jump-starts? weight loss. Specifics:  ? Start seven days before surgery:  ____________________.  ? NO SOLID FOODS!!   Your surgery will be CANCELED if this  diet is not followed!!!  ? Minimum of 64 ounces of fluid daily,  including protein drinks. ? No added sugar or carbonated beverages. ? Continue to take all your prescribed  medication and your vitamin supplements  during this preoperative diet phase. Clear liquids:  ? Water. ? Sugar free, non-carbonated beverages  (crystal light, propel). ? Sugar free popsicles. ? Sugar free Jell-O.  ? Fat free, reduced sodium broth . ? Decaffeinated coffee or decaffeinated tea  with artificial sweeteners. Protein:  ? 60 grams of protein daily  (in liquid supplements). ? Pre-made protein drinks. ? Protein powder added to water. ? 3 gram rule -- limit sugar and fat to less  than 3 grams per 8 ounces.   ? 4 to 6 ounces of low fat/low sugar yogurt  OR cottage cheese three to four times  during the Last appointment with  11/09/22.  Due for recheck -Appointment scheduled for 05/03/23.    Eprescribed to pharmacy per protocol.     week.  [facility] Gastric Bypass and Sleeve Dietary Progression  Date of Surgery: _ ______________________________________________________________  Ice chips start once admitted on floor. Clear liquid diet.  Begin bariatric clear liquid diet on: _ ______________________________________________   64 ounces of fluid per day. ? Low calorie, low sugar, non-carbonated beverages:  -- Water, Crystal Light, Propel Water, Sugar Free Jell-O, Sugar Free Popsicles, bouillon. -- Start protein supplement during this stage (60 to 70 grams per day). -- Getting your fluid in and staying hydrated is your number one priority! ? The clear liquid diet will last for seven days. Bariatric soft and moist diet.  Begin bariatric soft and moist diet on: _____________________________________________  ? This stage of the diet will last for five weeks, unless otherwise instructed by your surgeon. ? Begin -- one week after surgery. ? End -- six weeks after surgery (or when you follow up with the registered dietitian). ? Soft, moist, high protein foods -- three meals per day plus protein supplements. -- Portions should emphasize on soft protein. -- Portions will be a MAXIMUM of 1 ounce of solid food and 2 to 3 ounces of cottage cheese and yogurt. -- Protein supplements should be between meals and provide 30 to 40 grams per day during  soft protein diet. -- Continue to get 64 ounces of fluid in per day. ? Protein foods that are OK (SLOW TRANSITION) on the soft and moist diet:  -- First week on soft protein should focus on yogurt, cottage cheese, eggs, VEGETARIAN refried  beans, black beans, kidney beans and white beans. (NO BAKED BEANS.)  -- Second through fourth weeks should focus on yogurt, cottage cheese, eggs, canned tuna,  canned chicken, tilapia and fish (needs to be soft enough to be cut up with a fork).   -- Fifth week on soft protein diet should focus on yogurt, cottage cheese, eggs, canned tuna,  canned chicken, tilapia, fish, salmon, chicken breast or turkey. Remember to continue to get 64 ounces of fluid daily on ALL stages. To be advanced to bariatric maintenance stage of the bariatric diet, follow up with the dietitian  six weeks after surgery, around: Things I Need to Know Before Surgery  1. How many ounces of fluid will you need to drink every day after surgery? _________________  2. List three examples of bariatric clear liquids. __________________________________________________________________________  __________________________________________________________________________  __________________________________________________________________________  3. What is the 3 gram rule? ______________________________________________________  __________________________________________________________________________  __________________________________________________________________________  4. What is the 30 minute rule? ____________________________________________________  __________________________________________________________________________  __________________________________________________________________________  5. What are examples of food you will be able to eat on the bariatric soft and moist diet?  __________________________________________________________________________  __________________________________________________________________________  __________________________________________________________________________  6. After surgery I will be able to use a straw. ? TRUE ? FALSE  7. After surgery I will NOT be able to drink carbonated beverages. ? TRUE ? FALSE  -- 26 --  PATIENT GUIDE TO BARIATRIC [address] [facility]  8. After surgery I will be able to drink caffeine. ? TRUE ? FALSE  9.  What four vitamins will you take after surgery?  _____________________________________ _ ___________________________________  _____________________________________ Renaldo Womack ___________________________________  10. How much exercise should you get daily? _________________________________________  __________________________________________________________________________  11. How long should it take you to eat a meal? _ _______________________________________  12. The calcium I have purchased is: ________________________________________________ . This has _________ mg per serving. I will need to take this _________ times a day. 13. The protein drink I have decided on is: ____________________________________________ . This has _________ grams of protein. I will need to drink _________ of my protein drinks  during the full liquid phase and _________ during the soft protein phase. My protein drinks  will give me _________ ounces of fluid. 14. After having a sleeve gastrectomy I will not be able to take NSAIDs  (non-steroidal anti-inflammatory drugs) for _________ weeks. 15. After having a gastric bypass I will not be able to take NSAIDs  (non-steroidal anti-inflammatory drugs) for _____________ weeks. ___________________________________________________    Medications  Please discuss all of your current medications with your surgeon at your preoperative appointment. Your surgeon will inform you regarding which medications to stop before surgery and which  medications you are to take the morning of surgery. Medications to Stop  To minimize the risk of blood loss during surgery,  you must avoid or stop taking medicines that  contain anti-inflammatories, blood thinners, arthritis  medications, and herbal supplements seven to 14 days  before your surgery. A nurse from pre-anesthesia  testing will review your list of medication. Your current  medications will be reviewed at your preoperative  appointment with your surgeon. Note: You may take prescribed narcotics, such as  Vicodin, Ultram and Neurontin.   Diabetic Medications  Adjustments in your diabetic medications will be discussed with your surgeon at your  preoperative appointment. Birth Control  In order to decrease your chance of getting a postoperative blood clot you will be required to stop  any oral contraceptive two weeks before your surgery date. During this time it is your responsibility  to use an effective form of birth control. You will be required to take a pregnancy test the morning  of surgery at the hospital and surgery will be canceled if you are pregnant. We strongly encourage  that you resume your birth control two weeks after surgery or after your first menstrual cycle  following surgery. -- 28 --  PATIENT GUIDE TO BARIATRIC [address] [place] [address] Rd  Non-Steroidal Anti-Inflammatory Drugs (NSAIDs)  Bypass:  One class of medications to avoid after Baljinder-en-Y gastric  bypass is NSAIDs. These can cause ulcers or stomach irritation  and are linked to a kind of ulcer called a marginal ulcer after  gastric bypass. Marginal ulcers can bleed or perforate. Usually  they are not fatal, but they can cause months or years of pain,  and are a common cause of re-operation and reversal of gastric  bypass. You will NEVER be able to take NSAIDs again. Your only choice for over the counter pain medication will be  Tylenol (acetaminophen). Steroid use can also be harmful to your stomach but may be necessary in some situations. Please consult with your bariatric surgeon and prescribing physician for approval.  Sleeve gastrectomy:  Following a sleeve gastrectomy you will not be allowed to take NSAIDs unless it has been  discussed and approved by your surgeon. Most commonly taken NSAIDs to be AVOIDED!! 1. Ibuprofen  2. Advil  3. Motrin  4. Excedrin  5. Aspirin  6. Celebrex  7. Naproxen  8. Aleve  9. Voltaren  10. Mobic  The following is a list of NSAIDS that are NEVER to be taken again after gastric bypass surgery:  Ibuprofen which is also known as:  Advil, Advil Childrens, Advil Justin Strength, Advil Liquigel,  Advil Migraine, Advil Pediatric, Childrens Ibuprofen Berry, Genpril, IBU, Midol IB, Midol Maximum  Strength Cramp Formula, Dolgesic, Motrin Childrens, Motrin IB, Motrin Infant Drops, Motrin Justin  Strength, Motrin Migraine Pain, Nuprin, Migraine Liqui-gels, Ibu-Tab 200, Cap-Profen, Tab-Profen,  Profen, Ibuprohm, Children?s Elixsure, IB Pro, Vicoprofen, Combunox, A-G Profen, Actiprofen,  Addaprin, Advil Infants Concentrated Drops, Caldolor, Dwight, Q-Profen, Ibifon 600, Ibren,  Suh, Midol Cramps & Bodyaches, Rosebud, Pinky, Alejandro Ambrocio de Johnson, [place], [place], [facility]. -- 29 --  PATIENT GUIDE TO BARIATRIC [address] [place] [address]/[facility]OSP[facility]  Aspirin which has the brand name:  Arthritis Pain, Aspergum, Aspir-Low, Aspirin  Lite Coat, Osvaldo Aspirin, Bufferin, Easprin,  Ecotrin, Empirin, Fasprin, [place], Halfprin,  [place] Aspirin, [place] Aspirin, Excedrin. Ketoprofen which is known as: Orudis KT,  Oruvail, Actron. Sulindac which is sold as: Clinoril. Naproxen is one of the most common NSAIDs,  and is sold as: Aleve, Naprosyn, EC-Naprosyn,  Naprelan, Anaprox, All Day Pain Relief,  Aflaxen, All Day Relief, Anaprox-DS, Comfort  Pac With Naproxen, Aleve Caplet, Aleve Easy  Open Arthritis, Aleve Gelcap, Anaprox-DS,  EC-Naprosyn, Leader Naproxen Sodium, Midol  Extended Relief, Naprelan 375, Naprelan  500, Naprelan 750, Prevacid NapraPac 375,  Prevacid NapraPac, Naprapac, Vimovo. Etodolac is sold under the brand name:  Lodine XL, Lodine. Fenoprofen can be found on the market as:  Nalfon, Nalfon 200. Diclofenac sold as: Arthrotec, Cataflam,  Voltaren, Cambia, Voltaren-XR, Zipsor. Flurbiprofen sold as: Asaid. Ketorolac is sold under the brand name:  Sprix, Toradol, Toradol IM, Toradol IV/IM. Piroxicam is found on the market as: Feldene. Indomethacin known as: Indocin SR, Indocin,  Indo-Seymoru, Indomethegan, Indocin IV. Mefenamic Acid sold as: Ponstel.   Meloxicam is sold under the brand name:  Mobic. Nabumetone which is sold as: Relafen. Oxaprozin which has the brand name: Daypro. Ketoprofen which has the brand name:  Actron, Orudis KT, Orudis, Oruvail. Famotidine and Ibuprofen can be found as:  Duexis. Meclofenamate sold as: Meclomen. Tolmetin which can be found on the marked  as: Tolectin, Tolectin 600, Tolectin DS. Salsalate which is sold as: [facility]. Celecoxib which is sold as: Celebrex. -- 30 --  [address] [facility]  Smoking  It is required that ALL patients stop smoking  (including e-cigarettes) and chewing tobacco  three months before surgery. Prior to surgery  your surgeon will order a test to verify that  you have quit. Your surgery will be canceled  if you are smoking. Smoking or chewing tobacco leads to  decreased blood supply to your body?s tissues  and delays healing. Smoking harms every  organ in the body and has been linked to:  ? Blood clots (the leading cause of death after  bariatric surgery). ? Marginal ulcers after gastric bypass. ? Heart disease. ? Stroke. ? Chronic obstructive pulmonary  (lung) disease. ? Increased risk for hip fracture. ? Cataracts. ? Cancer of the mouth, throat, esophagus,  larynx (voice box), stomach, pancreas,  bladder, cervix, and kidney. Pregnancy  It is in your best interest to avoid pregnancy for  12 to 18 months after surgery. Pregnancy during  the rapid weight loss phase can be dangerous  and harmful to both mother and fetus. Do you have an effective method of birth  control? Please consult with your OB/GYN or  PCP for consultation. Alcohol  Alcohol is not recommended after bariatric  surgery. Alcohol contains calories but minimal  nutrition and will work against your weight  loss goal. For example, wine contains twice  the calories per ounce that regular soda does.   The absorption of alcohol changes with gastric  bypass and gastric sleeve because an enzyme  in the stomach which usually begins to digest  alcohol is absent or greatly reduced making  alcohol more potent. Alcohol may also be absorbed more quickly  into the body after gastric bypass or gastric  sleeve. The absorbed alcohol will be more  potent, and studies have demonstrated  that obesity surgery patients reach a higher  alcohol level and maintain the higher levels for  a longer period than others. In some patients  alcohol use can increase and lead to alcohol  dependence or addiction. For all of these  reasons, it is recommended to avoid alcohol  after bariatric surgery. Things to do before surgery:  ? Start the preoperative liquid diet on: _____________________________________________  ? Stop all NSAIDS (see page 30) and aspirin seven days before my surgery: _________________ .  Jeannette Goel my doctor(PCP or surgeon) regarding stopping Coumadin, Plavix or  other blood thinners. ? Purchase bariatric clear liquids (Crystal Lite, sugar free Jell-O, broth, sugar free popsicles,  protein supplement) and bariatric soft and moist foods (low fat yogurt, cottage cheese, eggs,  tuna, fish, chicken) so that I?m prepared when I get home from the hospital.  ? Purchase all vitamins that will be required following surgery. o Chewable multivitamin -- two per day (ex: Flintstones Complete). o Calcium Citrate -- 1,500 milligrams (500 milligrams, three times a day). o Vitamin B-12 -- 1,000 micrograms daily. o Vitamin D3 -- 5,000 IU daily. ? Create a system to keep track of how many ounces of fluid I am drinking daily  o Postoperative GOAL = minimum of 64 ounces per day. ? Purchase a protein supplement that I like.  o Brand: _ _________________________________________________________________ .  o Ounces: _________________________________________________________________ .  Jcarlos Echeverria of protein per serving: _________________________________________________ .   Before [address]  ? [place] your teeth -- upon awakening, you may brush your teeth and rinse with water, but do not  swallow the water. ? Take medication -- take only the medications as instructed by your provider with a small sip of  water as soon as you get up. ? Wear proper clothing that is loose-fitting and easily removed. ? Avoid back zippers and pantyhose. ? Please remove ALL jewelry (leave ALL jewelry and valuables at home). ? Avoid using perfumes, deodorant, shaving creams or any scented lotions. ? Bring a case with your name on it to hold your eyeglasses, contact lenses, hearing aids  and dentures. If you do not see your providers clean their hands, please ask them to do so.  ? Family and friends who visit you should not touch the surgical wound or dressings. ? Family and friends should clean their hands with soap and water or an alcohol-based hand  rub before and after visiting you. If you do not see them clean their hands, ask them to clean  their hands. ? Make sure you understand how to care for your incision before you leave the hospital.  ? Always clean your hands before and after caring for your incision. ? Make sure you know who to contact if you have questions or problems after you get home. ? If you have any symptoms of an infection, such as redness and pain at the surgery site, drainage,  or fever, call your doctor immediately. ? Ask your nursing staff to help you out of bed to walk within five hours of arriving at your  hospital room. Getting out of bed to walk helps to decrease complications, such as blood clots  and pneumonia. Length of Stay  You are many types of pain control methods that are available to control discomfort. Effective  pain control will allow you to be up and walking shortly after surgery. Your doctor will choose  the method that is right for you. Regardless of the method of pain medication being used, it is  important for you to communicate with your nurse if the pain medication is not enough.  Call your  nurse for pain medication when the pain is moderate instead of waiting for when pain is severe. What type of pain should I expect? ? Abdominal pain  ? Rib pain  ? Shoulder pain  ? Maxcine Murders feeling in the center of your chest  Nausea:  Nausea following bariatric surgery is very  common. Causes include:  ? Anesthesia  ? Drinking too fastYou will be allowed 1 to 2 ounces of ice chips per hour when you are admitted to the floor. They are solely for your comfort. They are not required. ? You will be expected to walk the night of your surgery. Please ask your nurse or nursing assistant  for help the first time you walk. Please do not walk if you still feel groggy or unsteady on your feet. ? Your pain will be controlled with oral and IV pain medication on the day of surgery. ? In order to prevent pneumonia after surgery you please use your incentive spirometer (ICS)  at least 10 times per hour (see below). ? Pain medication  ? Surgery itself  I understand the serious potential complications include: deep venous thrombosis/pulmonary  embolism (blood clots in the legs and or lungs); gastrointestinal leak (leakage of digestive contents  into the abdomen); sepsis (serious infection); injury to adjacent organs such as esophagus, spleen,  pancreas, liver, diaphragm (requiring intervention or surgical removal); excessive bleeding; bowel  obstruction (blockage of the intestines); or organ failure. I am informed that these complications  can be life threatening. The overall mortality rate (risk of death) from bariatric surgery is close to  0.1 percent (1/1,000), but can be as high as 0.5 percent (1/200) for some patients. Patient initials: ______________  I understand that complications requiring re-operation may occur, either immediately after initial  surgery, or later in the recovery process.   Patient initials: ______________  Other potential complications which I may have include: wound infections or seromas  (fluid collection under skin); hernias (breakdown of tissue holding in abdominal contents);  gastritis or stomach ulcer (inflammation of the stomach); formation of gallstones; formation  of kidney stones or urinary tract infection; or pneumonia. Device related complications such as  foreign body reaction, band slippage, or erosion may occur with the adjustable gastric bandIwill  Pre-op instructions:  1. Shower with the antibacterial soap  the 3 days prior to surgery. 2. Pre-admission testing will contact you 1 week before surgery  3. [facility] will contact you the day before surgery to confirm your surgery time.  Email or call your surgery scheduler if you have not heard from them by 3pm  4. Nothing to eat or drink (NPO) after midnight the night before surgery.  Take any evening medications by 11pm  5. Wear comfortable clothing. 6. Do not bring any valuables. 7. Bring insurance card, prescription card, photo ID and credit card to the hospital.  **Discuss all home medications with your surgeon at you pre-op appointment. Your surgeon will inform you of what medications to stop before surgery and what medications to take the day of surgery. **STOP all NSAIDS (Ibuprofen, Aleve, Advil, Naprosyn etc.) and Aspirin 7 days before your surgery      Important Information:  1. No lifting over 15 lbs. for 6 weeks post-op  2. No driving for 9-58 days after surgery - must be off pain medication. 3. No smoking EVER again! 4. You will NOT be able to take any Non-Steroidal Anti-inflammatories (NSAIDS), Aspirin or Steroids  a. Bypass/revision patients - EVER again. (Tylenol only)  b. Sleeve patients - 6 weeks after surgery  5. Pulse/temperature- twice daily for 2 weeks post-op   6. Avoid pregnancy for 18 months  7. Key Diet principles:  a. Vitamin/mineral supplements-[place] Complete, Calcium citrate, Vitamin D3, Vitamin B12  b. Protein supplements - 60-70 grams/day  c. Minimum 64 oz. fluid per day  d.        What to Expect in the Hospital:  Pre-op area:  o Emergency door take elevator 2nd floor  o Arrive 1.5 hours before surgery  o Lovenox (blood thinner)  o Incentive Spirometer  o SCDs  o Sign consent  o Anesthesia  Start IV    Operating Room:    o Gastric bypass: 2- 2 ½ hours  o Sleeve gastrectomy: 1-1 ½ hours  o Revision: 2-3 hours    PACU(Post Anesthesia Care Unit):  o Nasal cannula  o EKG monitor  o Blood pressure cuff  o Pulse Ox  o Schwartz Catheter  o SCDs  o No family allowed  o Minimum one hour  There are many types of pain control methods that are available to control discomfort. Effective  pain control will allow you to be up and walking shortly after surgery. Your doctor will choose  the method that is right for you. Regardless of the method of pain medication being used, it is  important for you to communicate with your nurse if the pain medication is not enough. Call your  nurse for pain medication when the pain is moderate instead of waiting for when pain is severe. What type of pain should I expect? ? Abdominal pain  ? Rib pain  ? Shoulder pain  ? Bethena Maker feeling in the center of your chest  Nausea:  Nausea following bariatric surgery is very  common. Causes include:  ? Anesthesia  ? Drinking too fast  ? Pain medication  ? Surgery itself    Expectations on your Day of Surgery  ? You will be allowed 1 to 2 ounces of ice chips per hour when you are admitted to the floor. They are solely for your comfort. They are not required. ? You will be expected to walk the night of your surgery. Please ask your nurse or nursing assistant  for help the first time you walk. Please do not walk if you still feel groggy or unsteady on your feet. ? Your pain will be controlled with oral and IV pain medication on the day of surgery.   ? In order to prevent pneumonia after surgery you please use your incentive spirometer (ICS)  at least 10 times per hour (see below    2 Central (Day of Surgery):  o Private room  o 1-2 oz. ice chips per hour   o IV Dilaudid  or liquid pain medication as needed for pain  o Discontinue suh catheter  o Walk within 4 hours  o One family member can spend the night (must 18 years or older)  o Cell phone/computers - OK    2 Central (Post-op Day 1/Post-op Day 2):  o 7am - Gastrograffin swallow study (X-ray) for:  o All sleeve gastrectomy patients  o All revision patients   o Discontinue -nasal cannula and heart rate monitor  o Start bariatric clear liquid diet - water, crystal light, broth, Jell-O and protein supplement  o Slowly increase to 3 oz. clear liquids and 1 oz. protein supplement per hour  o Oral pain medication as needed for pain - communicate with your nurse  o Goal:  48 to 64 ounces, clear liquid per day preferable water  o Discharge instructions/Lovenox self-injection instructions   o WALK & WATER    Post-op Goals:  1. Void within 8 hours of your catheter being removed  2. Pain is well controlled with oral pain medication  3. Nausea is under control/No vomiting  4. Tolerating 3 oz. of clear liquids and 1 oz. protein supplement per hour for 3 consecutive hours. Post-op Follow-up Labs will need to be completed 1-2 weeks BEFORE your 3 month, 6 month and yearly visits. These labs are required and your appointment will be rescheduled if they are not completed. My surgical procedure and post-operative hospital stay has been discussed with me and I have been given the opportunity to ask any questions I may have.     Patient Signature: ____________________________  Date: _____________________

## 2023-05-19 ENCOUNTER — OFFICE VISIT (OUTPATIENT)
Facility: CLINIC | Age: 53
End: 2023-05-19
Payer: COMMERCIAL

## 2023-05-19 VITALS
BODY MASS INDEX: 35.62 KG/M2 | WEIGHT: 263 LBS | SYSTOLIC BLOOD PRESSURE: 119 MMHG | HEIGHT: 72 IN | TEMPERATURE: 98.1 F | OXYGEN SATURATION: 94 % | HEART RATE: 56 BPM | DIASTOLIC BLOOD PRESSURE: 75 MMHG

## 2023-05-19 DIAGNOSIS — Z00.00 HEALTHCARE MAINTENANCE: ICD-10-CM

## 2023-05-19 DIAGNOSIS — Z99.89 OSA ON CPAP: ICD-10-CM

## 2023-05-19 DIAGNOSIS — G47.33 OSA ON CPAP: ICD-10-CM

## 2023-05-19 DIAGNOSIS — I10 ESSENTIAL HYPERTENSION: Primary | ICD-10-CM

## 2023-05-19 DIAGNOSIS — E66.9 OBESITY, CLASS II, BMI 35-39.9: ICD-10-CM

## 2023-05-19 PROBLEM — E66.01 MORBID OBESITY WITH BMI OF 45.0-49.9, ADULT (HCC): Status: RESOLVED | Noted: 2018-01-10 | Resolved: 2023-05-19

## 2023-05-19 PROBLEM — E66.812 OBESITY, CLASS II, BMI 35-39.9: Status: ACTIVE | Noted: 2018-01-10

## 2023-05-19 PROBLEM — E66.01 MORBID OBESITY (HCC): Status: RESOLVED | Noted: 2019-04-30 | Resolved: 2023-05-19

## 2023-05-19 PROCEDURE — 99396 PREV VISIT EST AGE 40-64: CPT | Performed by: STUDENT IN AN ORGANIZED HEALTH CARE EDUCATION/TRAINING PROGRAM

## 2023-05-19 PROCEDURE — 3074F SYST BP LT 130 MM HG: CPT | Performed by: STUDENT IN AN ORGANIZED HEALTH CARE EDUCATION/TRAINING PROGRAM

## 2023-05-19 PROCEDURE — 3078F DIAST BP <80 MM HG: CPT | Performed by: STUDENT IN AN ORGANIZED HEALTH CARE EDUCATION/TRAINING PROGRAM

## 2023-05-19 RX ORDER — LISINOPRIL 10 MG/1
10 TABLET ORAL DAILY
Qty: 90 TABLET | Refills: 3 | Status: SHIPPED | OUTPATIENT
Start: 2023-05-19

## 2023-05-19 SDOH — ECONOMIC STABILITY: FOOD INSECURITY: WITHIN THE PAST 12 MONTHS, THE FOOD YOU BOUGHT JUST DIDN'T LAST AND YOU DIDN'T HAVE MONEY TO GET MORE.: NEVER TRUE

## 2023-05-19 SDOH — ECONOMIC STABILITY: FOOD INSECURITY: WITHIN THE PAST 12 MONTHS, YOU WORRIED THAT YOUR FOOD WOULD RUN OUT BEFORE YOU GOT MONEY TO BUY MORE.: NEVER TRUE

## 2023-05-19 SDOH — ECONOMIC STABILITY: INCOME INSECURITY: HOW HARD IS IT FOR YOU TO PAY FOR THE VERY BASICS LIKE FOOD, HOUSING, MEDICAL CARE, AND HEATING?: NOT HARD AT ALL

## 2023-05-19 SDOH — ECONOMIC STABILITY: HOUSING INSECURITY
IN THE LAST 12 MONTHS, WAS THERE A TIME WHEN YOU DID NOT HAVE A STEADY PLACE TO SLEEP OR SLEPT IN A SHELTER (INCLUDING NOW)?: NO

## 2023-05-19 ASSESSMENT — PATIENT HEALTH QUESTIONNAIRE - PHQ9
2. FEELING DOWN, DEPRESSED OR HOPELESS: 0
SUM OF ALL RESPONSES TO PHQ QUESTIONS 1-9: 0
SUM OF ALL RESPONSES TO PHQ9 QUESTIONS 1 & 2: 0
SUM OF ALL RESPONSES TO PHQ QUESTIONS 1-9: 0
1. LITTLE INTEREST OR PLEASURE IN DOING THINGS: 0

## 2023-05-19 NOTE — PROGRESS NOTES
Chicho Hensley presents today for   Chief Complaint   Patient presents with    Annual Exam     Patient has no concerns at this time. Is someone accompanying this pt? No     Is the patient using any DME equipment during OV? No     Health Maintenance reviewed and discussed and ordered per Provider. Health Maintenance Due   Topic Date Due    HIV screen  Never done    DTaP/Tdap/Td vaccine (1 - Tdap) Never done    Shingles vaccine (1 of 2) Never done    COVID-19 Vaccine (3 - Booster for Pfizer series) 02/07/2022   . Coordination of Care:  1. \"Have you been to the ER, urgent care clinic since your last visit? Hospitalized since your last visit? \" No    2. \"Have you seen or consulted any other health care providers outside of the [address] [place] [address] since your last visit? \" Yes Dr. Lisandra Dalal for elbow surgery     3. For patients aged 39-70: Has the patient had a colonoscopy?  Yes- No care gap present

## 2023-05-19 NOTE — PROGRESS NOTES
Subjective:   Flakita Bautista is a 46 y.o. male who was seen for Annual Exam (Patient has no concerns at this time. )    Patient here for annual physical.  He has no specific complaints at this time. He would like a refill on lisinopril as he thinks he might be out. He has a history of bariatric surgery and is on a number of vitamins because of this. He has a history of sleep apnea which resolved    Prior to Admission medications    Medication Sig Start Date End Date Taking? Authorizing Provider   lisinopril (PRINIVIL;ZESTRIL) 10 MG tablet Take 1 tablet by mouth daily 23  Yes Ras Vitale MD   calcium carbonate 1500 (600 Ca) MG TABS tablet Take 1 tablet by mouth 2 times daily   Yes Ar Automatic Reconciliation   vitamin D 25 MCG (1000 UT) CAPS Take 5 capsules by mouth daily   Yes Ar Automatic Reconciliation   cyanocobalamin 1000 MCG tablet Take 1 tablet by mouth every other day   Yes Ar Automatic Reconciliation   thiamine 100 MG tablet Take by mouth daily   Yes Ar Automatic Reconciliation     No Known Allergies  Social History     Tobacco Use    Smoking status: Former     Packs/day: 0.50     Years: 20.00     Pack years: 10.00     Types: Cigarettes     Start date:      Quit date: 1/10/2010     Years since quittin.3    Smokeless tobacco: Never   Vaping Use    Vaping Use: Never used   Substance Use Topics    Alcohol use: Not Currently    Drug use: No        Review of Systems   As noted above in HPI. Objective:   /75   Pulse 56   Temp 98.1 °F (36.7 °C) (Temporal)   Ht 6' (1.829 m)   Wt 263 lb (119.3 kg)   SpO2 94%   BMI 35.67 kg/m²      General: alert, oriented, not in distress  Chest/Lungs: clear breath sounds, no wheezing or crackles  Heart: normal rate, regular rhythm, no murmur  Extremities: no focal deformities, no edema  Skin: no active skin lesions      Assessment & Plan:     Essential hypertension  Comments:  Controlled. Continue lisinopril 10 mg daily.  DASH

## 2024-05-24 ENCOUNTER — OFFICE VISIT (OUTPATIENT)
Facility: CLINIC | Age: 54
End: 2024-05-24
Payer: COMMERCIAL

## 2024-05-24 VITALS
RESPIRATION RATE: 16 BRPM | DIASTOLIC BLOOD PRESSURE: 82 MMHG | TEMPERATURE: 97.5 F | HEART RATE: 51 BPM | WEIGHT: 259 LBS | SYSTOLIC BLOOD PRESSURE: 125 MMHG | OXYGEN SATURATION: 96 % | BODY MASS INDEX: 35.08 KG/M2 | HEIGHT: 72 IN

## 2024-05-24 DIAGNOSIS — E78.2 MIXED HYPERLIPIDEMIA: ICD-10-CM

## 2024-05-24 DIAGNOSIS — E66.01 SEVERE OBESITY (BMI 35.0-39.9) WITH COMORBIDITY (HCC): ICD-10-CM

## 2024-05-24 DIAGNOSIS — Z12.5 ENCOUNTER FOR SCREENING FOR MALIGNANT NEOPLASM OF PROSTATE: ICD-10-CM

## 2024-05-24 DIAGNOSIS — Z11.4 SCREENING FOR HIV WITHOUT PRESENCE OF RISK FACTORS: ICD-10-CM

## 2024-05-24 DIAGNOSIS — I10 ESSENTIAL HYPERTENSION: Primary | ICD-10-CM

## 2024-05-24 DIAGNOSIS — Z13.1 DIABETES MELLITUS SCREENING: ICD-10-CM

## 2024-05-24 DIAGNOSIS — R68.89 OTHER GENERAL SYMPTOMS AND SIGNS: ICD-10-CM

## 2024-05-24 PROCEDURE — 99214 OFFICE O/P EST MOD 30 MIN: CPT | Performed by: NURSE PRACTITIONER

## 2024-05-24 PROCEDURE — 3079F DIAST BP 80-89 MM HG: CPT | Performed by: NURSE PRACTITIONER

## 2024-05-24 PROCEDURE — 3074F SYST BP LT 130 MM HG: CPT | Performed by: NURSE PRACTITIONER

## 2024-05-24 RX ORDER — LISINOPRIL 10 MG/1
10 TABLET ORAL DAILY
Qty: 90 TABLET | Refills: 3 | Status: SHIPPED | OUTPATIENT
Start: 2024-05-24

## 2024-05-24 SDOH — ECONOMIC STABILITY: FOOD INSECURITY: WITHIN THE PAST 12 MONTHS, YOU WORRIED THAT YOUR FOOD WOULD RUN OUT BEFORE YOU GOT MONEY TO BUY MORE.: NEVER TRUE

## 2024-05-24 SDOH — ECONOMIC STABILITY: INCOME INSECURITY: HOW HARD IS IT FOR YOU TO PAY FOR THE VERY BASICS LIKE FOOD, HOUSING, MEDICAL CARE, AND HEATING?: NOT HARD AT ALL

## 2024-05-24 SDOH — ECONOMIC STABILITY: FOOD INSECURITY: WITHIN THE PAST 12 MONTHS, THE FOOD YOU BOUGHT JUST DIDN'T LAST AND YOU DIDN'T HAVE MONEY TO GET MORE.: NEVER TRUE

## 2024-05-24 ASSESSMENT — PATIENT HEALTH QUESTIONNAIRE - PHQ9
SUM OF ALL RESPONSES TO PHQ9 QUESTIONS 1 & 2: 0
SUM OF ALL RESPONSES TO PHQ QUESTIONS 1-9: 0
1. LITTLE INTEREST OR PLEASURE IN DOING THINGS: NOT AT ALL
2. FEELING DOWN, DEPRESSED OR HOPELESS: NOT AT ALL

## 2024-05-24 NOTE — PROGRESS NOTES
Patient states his left index finger has been bothering him for about a month.       Chief Complaint   Patient presents with    Annual Exam       \"Have you been to the ER, urgent care clinic since your last visit?  Hospitalized since your last visit?\"    NO    “Have you seen or consulted any other health care providers outside of [facility] since your last visit?”    NO            
diagnosis with the patient and the intended plan as seen in the above orders.  The patient has received an after-visit summary and questions were answered concerning future plans.  I have discussed medication side effects and warnings with the patient as well. I have reviewed the plan of care with the patient, accepted their input and they are in agreement with the treatment goals.       Perry Oneal, RUDY - NP  May 24, 2024

## 2024-05-30 ENCOUNTER — HOSPITAL ENCOUNTER (OUTPATIENT)
Age: 54
Discharge: HOME | End: 2024-05-30
Payer: COMMERCIAL

## 2024-05-30 DIAGNOSIS — R68.89 OTHER GENERAL SYMPTOMS AND SIGNS: ICD-10-CM

## 2024-05-30 DIAGNOSIS — Z12.5 ENCOUNTER FOR SCREENING FOR MALIGNANT NEOPLASM OF PROSTATE: ICD-10-CM

## 2024-05-30 DIAGNOSIS — I10 ESSENTIAL HYPERTENSION: ICD-10-CM

## 2024-05-30 DIAGNOSIS — E78.2 MIXED HYPERLIPIDEMIA: ICD-10-CM

## 2024-05-30 DIAGNOSIS — Z13.1 DIABETES MELLITUS SCREENING: ICD-10-CM

## 2024-05-30 DIAGNOSIS — Z11.4 SCREENING FOR HIV WITHOUT PRESENCE OF RISK FACTORS: ICD-10-CM

## 2024-05-30 LAB
ALBUMIN SERPL-MCNC: 3.6 G/DL (ref 3.4–5)
ALBUMIN/GLOB SERPL: 1.1 (ref 0.8–1.7)
ALP SERPL-CCNC: 78 U/L (ref 45–117)
ALT SERPL-CCNC: 22 U/L (ref 16–61)
ANION GAP SERPL CALC-SCNC: 7 MMOL/L (ref 3–18)
AST SERPL W P-5'-P-CCNC: 17 U/L (ref 10–38)
BASOPHILS # BLD: 0 K/UL (ref 0–0.1)
BASOPHILS NFR BLD: 1 % (ref 0–2)
BILIRUB SERPL-MCNC: 1.1 MG/DL (ref 0.2–1)
BUN SERPL-MCNC: 10 MG/DL (ref 7–18)
BUN/CREAT SERPL: 12 (ref 12–20)
CA-I BLD-MCNC: 8.8 MG/DL (ref 8.5–10.1)
CHLORIDE SERPL-SCNC: 107 MMOL/L (ref 100–111)
CHOLEST SERPL-MCNC: 205 MG/DL
CO2 SERPL-SCNC: 26 MMOL/L (ref 21–32)
CREAT SERPL-MCNC: 0.84 MG/DL (ref 0.6–1.3)
DIFFERENTIAL METHOD BLD: ABNORMAL
EOSINOPHIL # BLD: 0.2 K/UL (ref 0–0.4)
EOSINOPHIL NFR BLD: 3 % (ref 0–5)
ERYTHROCYTE [DISTWIDTH] IN BLOOD BY AUTOMATED COUNT: 12.5 % (ref 11.6–14.5)
EST. AVERAGE GLUCOSE BLD GHB EST-MCNC: 94 MG/DL
GLOBULIN SER CALC-MCNC: 3.3 G/DL (ref 2–4)
GLUCOSE SERPL-MCNC: 105 MG/DL (ref 74–99)
HBA1C MFR BLD: 4.9 % (ref 4.2–5.6)
HCT VFR BLD AUTO: 46.7 % (ref 36–48)
HDLC SERPL-MCNC: 74 MG/DL (ref 40–60)
HDLC SERPL: 2.8 (ref 0–5)
HGB BLD-MCNC: 16.6 G/DL (ref 13–16)
IMM GRANULOCYTES # BLD AUTO: 0 K/UL (ref 0–0.04)
IMM GRANULOCYTES NFR BLD AUTO: 0 % (ref 0–0.5)
LDLC SERPL CALC-MCNC: 116.8 MG/DL (ref 0–100)
LIPID PANEL: ABNORMAL
LYMPHOCYTES # BLD: 1.9 K/UL (ref 0.9–3.6)
LYMPHOCYTES NFR BLD: 34 % (ref 21–52)
MCH RBC QN AUTO: 32 PG (ref 24–34)
MCHC RBC AUTO-ENTMCNC: 35.5 G/DL (ref 31–37)
MCV RBC AUTO: 90.2 FL (ref 78–100)
MONOCYTES # BLD: 0.5 K/UL (ref 0.05–1.2)
MONOCYTES NFR BLD: 8 % (ref 3–10)
NEUTS SEG # BLD: 2.9 K/UL (ref 1.8–8)
NEUTS SEG NFR BLD: 54 % (ref 40–73)
NRBC # BLD: 0 K/UL (ref 0–0.01)
NRBC BLD-RTO: 0 PER 100 WBC
PLATELET # BLD AUTO: 205 K/UL (ref 135–420)
PMV BLD AUTO: 10.6 FL (ref 9.2–11.8)
POTASSIUM SERPL-SCNC: 4.1 MMOL/L (ref 3.5–5.5)
PROT SERPL-MCNC: 6.9 G/DL (ref 6.4–8.2)
PSA SERPL-MCNC: 0.5 NG/ML (ref 0–4)
RBC # BLD AUTO: 5.18 M/UL (ref 4.35–5.65)
SODIUM SERPL-SCNC: 140 MMOL/L (ref 136–145)
TRIGL SERPL-MCNC: 71 MG/DL
TSH SERPL DL<=0.05 MIU/L-ACNC: 3.07 UIU/ML (ref 0.36–3.74)
VIT B12 SERPL-MCNC: 606 PG/ML (ref 211–911)
VLDLC SERPL CALC-MCNC: 14.2 MG/DL
WBC # BLD AUTO: 5.4 K/UL (ref 4.6–13.2)

## 2024-05-30 PROCEDURE — 83036 HEMOGLOBIN GLYCOSYLATED A1C: CPT

## 2024-05-30 PROCEDURE — 82607 VITAMIN B-12: CPT

## 2024-05-30 PROCEDURE — 84443 ASSAY THYROID STIM HORMONE: CPT

## 2024-05-30 PROCEDURE — 80053 COMPREHEN METABOLIC PANEL: CPT

## 2024-05-30 PROCEDURE — 36415 COLL VENOUS BLD VENIPUNCTURE: CPT

## 2024-05-30 PROCEDURE — 85025 COMPLETE CBC W/AUTO DIFF WBC: CPT

## 2024-05-30 PROCEDURE — G0103 PSA SCREENING: HCPCS

## 2024-05-30 PROCEDURE — 87389 HIV-1 AG W/HIV-1&-2 AB AG IA: CPT

## 2024-05-30 PROCEDURE — 80061 LIPID PANEL: CPT

## 2024-05-31 ENCOUNTER — TELEPHONE (OUTPATIENT)
Facility: CLINIC | Age: 54
End: 2024-05-31

## 2024-05-31 LAB
HIV 1+2 AB+HIV1 P24 AG SERPL QL IA: NONREACTIVE
HIV 1/2 RESULT COMMENT: NORMAL

## 2024-05-31 NOTE — TELEPHONE ENCOUNTER
Spoke with patient via phone call on 5/31/2024 of laboratory results.  Patient understood and had no further questions at this time.

## 2025-05-27 ENCOUNTER — OFFICE VISIT (OUTPATIENT)
Facility: CLINIC | Age: 55
End: 2025-05-27
Payer: COMMERCIAL

## 2025-05-27 VITALS
HEART RATE: 61 BPM | HEIGHT: 72 IN | SYSTOLIC BLOOD PRESSURE: 134 MMHG | TEMPERATURE: 98 F | WEIGHT: 262 LBS | DIASTOLIC BLOOD PRESSURE: 89 MMHG | BODY MASS INDEX: 35.49 KG/M2 | OXYGEN SATURATION: 95 % | RESPIRATION RATE: 18 BRPM

## 2025-05-27 DIAGNOSIS — I10 ESSENTIAL HYPERTENSION: Primary | ICD-10-CM

## 2025-05-27 DIAGNOSIS — E78.2 MIXED HYPERLIPIDEMIA: ICD-10-CM

## 2025-05-27 DIAGNOSIS — Z12.5 ENCOUNTER FOR SCREENING FOR MALIGNANT NEOPLASM OF PROSTATE: ICD-10-CM

## 2025-05-27 DIAGNOSIS — Z13.1 DIABETES MELLITUS SCREENING: ICD-10-CM

## 2025-05-27 DIAGNOSIS — R68.89 OTHER GENERAL SYMPTOMS AND SIGNS: ICD-10-CM

## 2025-05-27 PROCEDURE — 3075F SYST BP GE 130 - 139MM HG: CPT | Performed by: NURSE PRACTITIONER

## 2025-05-27 PROCEDURE — 3079F DIAST BP 80-89 MM HG: CPT | Performed by: NURSE PRACTITIONER

## 2025-05-27 PROCEDURE — 99214 OFFICE O/P EST MOD 30 MIN: CPT | Performed by: NURSE PRACTITIONER

## 2025-05-27 RX ORDER — LISINOPRIL 10 MG/1
10 TABLET ORAL DAILY
Qty: 90 TABLET | Refills: 3 | Status: SHIPPED | OUTPATIENT
Start: 2025-05-27

## 2025-05-27 SDOH — ECONOMIC STABILITY: FOOD INSECURITY: WITHIN THE PAST 12 MONTHS, THE FOOD YOU BOUGHT JUST DIDN'T LAST AND YOU DIDN'T HAVE MONEY TO GET MORE.: NEVER TRUE

## 2025-05-27 SDOH — ECONOMIC STABILITY: FOOD INSECURITY: WITHIN THE PAST 12 MONTHS, YOU WORRIED THAT YOUR FOOD WOULD RUN OUT BEFORE YOU GOT MONEY TO BUY MORE.: NEVER TRUE

## 2025-05-27 ASSESSMENT — PATIENT HEALTH QUESTIONNAIRE - PHQ9
SUM OF ALL RESPONSES TO PHQ QUESTIONS 1-9: 0
1. LITTLE INTEREST OR PLEASURE IN DOING THINGS: NOT AT ALL
SUM OF ALL RESPONSES TO PHQ QUESTIONS 1-9: 0
SUM OF ALL RESPONSES TO PHQ QUESTIONS 1-9: 0
2. FEELING DOWN, DEPRESSED OR HOPELESS: NOT AT ALL
SUM OF ALL RESPONSES TO PHQ QUESTIONS 1-9: 0

## 2025-05-27 NOTE — PROGRESS NOTES
History of Present Illness  Ming Soria is a 54 y.o. male who presents today for:    Chief Complaint   Patient presents with    Annual Exam       Past Medical History  Past Medical History:   Diagnosis Date    Arthritis     Bronchitis     Hypercholesterolemia     Hypertension     Sleep apnea     cpap        Surgical History  Past Surgical History:   Procedure Laterality Date    COLONOSCOPY N/A 5/10/2022    COLONOSCOPY and polypectomy performed by Abdifatah Machado MD at [facility] ENDOSCOPY    GI  04/30/2019    gastric bypass    KNEE ARTHROSCOPY Left     torn meniscus    OTHER SURGICAL HISTORY      wisdom teeth and implanted tooth        Current Medications  Current Outpatient Medications   Medication Sig    lisinopril (PRINIVIL;ZESTRIL) 10 MG tablet Take 1 tablet by mouth daily    calcium carbonate 1500 (600 Ca) MG TABS tablet Take 1 tablet by mouth 2 times daily    vitamin D 25 MCG (1000 UT) CAPS Take 5 capsules by mouth daily    cyanocobalamin 1000 MCG tablet Take 1 tablet by mouth every other day    thiamine 100 MG tablet Take by mouth daily     No current facility-administered medications for this visit.       Allergies/Drug Reactions  No Known Allergies     Family History  Family History   Problem Relation Age of Onset    Diabetes Mother     Heart Disease Mother     Kidney Disease Mother     No Known Problems Father         Social History  Social History     Tobacco Use    Smoking status: Former     Current packs/day: 0.00     Average packs/day: 0.5 packs/day for 20.0 years (10.0 ttl pk-yrs)     Types: Cigarettes     Start date: 2003     Quit date: 1/10/2010     Years since quitting: 15.3    Smokeless tobacco: Never   Vaping Use    Vaping status: Never Used   Substance Use Topics    Alcohol use: Not Currently    Drug use: No        Health Maintenance   Topic Date Due    Hepatitis B vaccine (1 of 3 - 19+ 3-dose series) Never done    DTaP/Tdap/Td vaccine (1 - Tdap) Never done    Shingles vaccine (1 of 2) Never

## 2025-05-27 NOTE — PROGRESS NOTES
Ming Soria presents today for   Chief Complaint   Patient presents with    Annual Exam       Is someone accompanying this pt? no    Is the patient using any DME equipment during OV? no    Health Maintenance Due   Topic Date Due    Hepatitis B vaccine (1 of 3 - 19+ 3-dose series) Never done    DTaP/Tdap/Td vaccine (1 - Tdap) Never done    Shingles vaccine (1 of 2) Never done    Pneumococcal 50+ years Vaccine (1 of 1 - PCV) Never done    COVID-19 Vaccine (3 - 2024-25 season) 09/01/2024    Depression Screen  05/24/2025         \"Have you been to the ER, urgent care clinic since your last visit?  Hospitalized since your last visit?\"    NO    “Have you seen or consulted any other health care providers outside our system since your last visit?”    NO

## 2025-05-30 ENCOUNTER — HOSPITAL ENCOUNTER (OUTPATIENT)
Age: 55
Discharge: HOME | End: 2025-05-30
Payer: COMMERCIAL

## 2025-05-30 DIAGNOSIS — R68.89 OTHER GENERAL SYMPTOMS AND SIGNS: ICD-10-CM

## 2025-05-30 DIAGNOSIS — E78.2 MIXED HYPERLIPIDEMIA: ICD-10-CM

## 2025-05-30 DIAGNOSIS — Z13.1 DIABETES MELLITUS SCREENING: ICD-10-CM

## 2025-05-30 DIAGNOSIS — I10 ESSENTIAL HYPERTENSION: ICD-10-CM

## 2025-05-30 DIAGNOSIS — Z12.5 ENCOUNTER FOR SCREENING FOR MALIGNANT NEOPLASM OF PROSTATE: ICD-10-CM

## 2025-05-30 LAB
ALBUMIN SERPL-MCNC: 3.4 G/DL (ref 3.4–5)
ALBUMIN/GLOB SERPL: 1.1
ALP SERPL-CCNC: 76 U/L (ref 45–117)
ALT SERPL-CCNC: 16 U/L (ref 10–50)
ANION GAP SERPL CALC-SCNC: 14 MMOL/L
AST SERPL W P-5'-P-CCNC: 20 U/L (ref 10–38)
BASOPHILS # BLD: 0.04 K/UL (ref 0–0.1)
BASOPHILS NFR BLD: 0.8 % (ref 0–2)
BILIRUB SERPL-MCNC: 0.7 MG/DL (ref 0.2–1)
BUN SERPL-MCNC: 8 MG/DL (ref 6–23)
BUN/CREAT SERPL: 9
CA-I BLD-MCNC: 8.9 MG/DL (ref 8.5–10.1)
CHLORIDE SERPL-SCNC: 103 MMOL/L (ref 98–107)
CHOLEST SERPL-MCNC: 203 MG/DL
CO2 SERPL-SCNC: 23 MMOL/L (ref 21–32)
CREAT SERPL-MCNC: 0.84 MG/DL (ref 0.6–1.3)
DIFFERENTIAL METHOD BLD: ABNORMAL
EOSINOPHIL # BLD: 0.19 K/UL (ref 0–0.4)
EOSINOPHIL NFR BLD: 3.7 % (ref 0–5)
ERYTHROCYTE [DISTWIDTH] IN BLOOD BY AUTOMATED COUNT: 12.5 % (ref 11.6–14.5)
EST. AVERAGE GLUCOSE BLD GHB EST-MCNC: 102 MG/DL
GLOBULIN SER CALC-MCNC: 3 G/DL
GLUCOSE SERPL-MCNC: 101 MG/DL (ref 74–108)
HBA1C MFR BLD: 5.2 % (ref 4.2–5.6)
HCT VFR BLD AUTO: 45.7 % (ref 36–48)
HDLC SERPL-MCNC: 64 MG/DL (ref 40–60)
HDLC SERPL: 3.2 (ref 0–5)
HGB BLD-MCNC: 16.1 G/DL (ref 13–16)
IMM GRANULOCYTES # BLD AUTO: 0.01 K/UL (ref 0–0.04)
IMM GRANULOCYTES NFR BLD AUTO: 0.2 % (ref 0–0.5)
LDLC SERPL CALC-MCNC: 128 MG/DL (ref 0–100)
LYMPHOCYTES # BLD: 1.67 K/UL (ref 0.9–3.6)
LYMPHOCYTES NFR BLD: 32.2 % (ref 21–52)
MCH RBC QN AUTO: 32.6 PG (ref 24–34)
MCHC RBC AUTO-ENTMCNC: 35.2 G/DL (ref 31–37)
MCV RBC AUTO: 92.5 FL (ref 78–100)
MONOCYTES # BLD: 0.54 K/UL (ref 0.05–1.2)
MONOCYTES NFR BLD: 10.4 % (ref 3–10)
NEUTS SEG # BLD: 2.74 K/UL (ref 1.8–8)
NEUTS SEG NFR BLD: 52.7 % (ref 40–73)
NRBC # BLD: 0 K/UL (ref 0–0.01)
NRBC BLD-RTO: 0 PER 100 WBC
PLATELET # BLD AUTO: 196 K/UL (ref 135–420)
PMV BLD AUTO: 10.2 FL (ref 9.2–11.8)
POTASSIUM SERPL-SCNC: 4.4 MMOL/L (ref 3.5–5.5)
PROT SERPL-MCNC: 6.5 G/DL (ref 6.4–8.2)
PSA SERPL-MCNC: 0.5 NG/ML (ref 1.4–4.4)
RBC # BLD AUTO: 4.94 M/UL (ref 4.35–5.65)
SODIUM SERPL-SCNC: 140 MMOL/L (ref 136–145)
TRIGL SERPL-MCNC: 59 MG/DL (ref 0–150)
TSH, 3RD GENERATION: 4.25 UIU/ML (ref 0.27–4.2)
VIT B12 SERPL-MCNC: 977 PG/ML (ref 211–911)
VLDLC SERPL CALC-MCNC: 12 MG/DL
WBC # BLD AUTO: 5.2 K/UL (ref 4.6–13.2)

## 2025-05-30 PROCEDURE — 82607 VITAMIN B-12: CPT

## 2025-05-30 PROCEDURE — 80053 COMPREHEN METABOLIC PANEL: CPT

## 2025-05-30 PROCEDURE — 85025 COMPLETE CBC W/AUTO DIFF WBC: CPT

## 2025-05-30 PROCEDURE — 80061 LIPID PANEL: CPT

## 2025-05-30 PROCEDURE — 83036 HEMOGLOBIN GLYCOSYLATED A1C: CPT

## 2025-05-30 PROCEDURE — 36415 COLL VENOUS BLD VENIPUNCTURE: CPT

## 2025-05-30 PROCEDURE — 84443 ASSAY THYROID STIM HORMONE: CPT

## 2025-05-30 PROCEDURE — G0103 PSA SCREENING: HCPCS

## 2025-08-13 ENCOUNTER — OFFICE VISIT (OUTPATIENT)
Facility: CLINIC | Age: 55
End: 2025-08-13
Payer: COMMERCIAL

## 2025-08-13 VITALS
OXYGEN SATURATION: 94 % | TEMPERATURE: 98.2 F | HEART RATE: 67 BPM | SYSTOLIC BLOOD PRESSURE: 132 MMHG | WEIGHT: 259 LBS | BODY MASS INDEX: 35.08 KG/M2 | DIASTOLIC BLOOD PRESSURE: 91 MMHG | RESPIRATION RATE: 18 BRPM | HEIGHT: 72 IN

## 2025-08-13 DIAGNOSIS — M54.50 ACUTE LEFT-SIDED LOW BACK PAIN WITHOUT SCIATICA: Primary | ICD-10-CM

## 2025-08-13 PROCEDURE — 3080F DIAST BP >= 90 MM HG: CPT | Performed by: FAMILY MEDICINE

## 2025-08-13 PROCEDURE — 99213 OFFICE O/P EST LOW 20 MIN: CPT | Performed by: FAMILY MEDICINE

## 2025-08-13 PROCEDURE — 3075F SYST BP GE 130 - 139MM HG: CPT | Performed by: FAMILY MEDICINE

## 2025-08-13 RX ORDER — BACLOFEN 10 MG/1
10 TABLET ORAL 3 TIMES DAILY PRN
Qty: 30 TABLET | Refills: 0 | Status: SHIPPED | OUTPATIENT
Start: 2025-08-13 | End: 2025-08-23

## 2025-08-13 RX ORDER — METHYLPREDNISOLONE 4 MG/1
TABLET ORAL
Qty: 1 KIT | Refills: 0 | Status: SHIPPED | OUTPATIENT
Start: 2025-08-13 | End: 2025-08-19

## 2025-08-13 ASSESSMENT — PATIENT HEALTH QUESTIONNAIRE - PHQ9
SUM OF ALL RESPONSES TO PHQ QUESTIONS 1-9: 0
SUM OF ALL RESPONSES TO PHQ QUESTIONS 1-9: 0
2. FEELING DOWN, DEPRESSED OR HOPELESS: NOT AT ALL
1. LITTLE INTEREST OR PLEASURE IN DOING THINGS: NOT AT ALL
SUM OF ALL RESPONSES TO PHQ QUESTIONS 1-9: 0
SUM OF ALL RESPONSES TO PHQ QUESTIONS 1-9: 0

## 2025-08-13 ASSESSMENT — ENCOUNTER SYMPTOMS
ABDOMINAL PAIN: 0
CONSTIPATION: 0
EYES NEGATIVE: 1
WHEEZING: 0
DIARRHEA: 0
BACK PAIN: 1
COUGH: 0
SHORTNESS OF BREATH: 0
VOMITING: 0
BLOOD IN STOOL: 0
CHEST TIGHTNESS: 0
NAUSEA: 0

## (undated) DEVICE — GLOVE SURG SZ 7.5 L11.73IN FNGR THK7.5MIL STRW LTX POLYMER

## (undated) DEVICE — SOL IRR STRL H2O 500ML STRL --

## (undated) DEVICE — PACK PROCEDURE SURG LAPAROSCOPY 17X7 MM BRTRC PRIMUS

## (undated) DEVICE — ENDOGATOR TUBING FOR BOSTON SCIENTIFIC ENDOSTAT II PUMP, OLYMPUS OFP PUMP OR ENDO STRATUS PUMP: Brand: ENDOGATOR

## (undated) DEVICE — PREP SKN CHLORHEX GLUC 8OZ --

## (undated) DEVICE — SHEARS: Brand: ENDO SHEARS

## (undated) DEVICE — TROCAR ENDOSCP SHFT L100MM DIA12MM INTEGR STBL ENDOPATH

## (undated) DEVICE — TUBING INSUFFLATION CAP W/ EXT CARBON DIOX ENDO SMARTCAP

## (undated) DEVICE — TROCAR ENDOSCP L100MM DIA12MM STBL SL BLDELSS ENDOPATH XCEL

## (undated) DEVICE — SHEAR HARMONIC ACET 5MMX36CM -- ACE PLUS

## (undated) DEVICE — APPLICATOR BNDG 1MM ADH PREMIERPRO EXOFIN

## (undated) DEVICE — STAPLER SKIN L440MM 32MM LNG 12 FIRING B FRM PWR + GRIPPING

## (undated) DEVICE — TUBING, SUCTION, 9/32" X 10', STRAIGHT: Brand: MEDLINE

## (undated) DEVICE — SUTURE VCRL SZ 2-0 L54IN ABSRB VLT W/O NDL POLYGLACTIN 910 J618H

## (undated) DEVICE — LIGHT HANDLE: Brand: DEVON

## (undated) DEVICE — KIT CATH OD16FR 5ML BLLN SIL URIN INDWL STR TIP INF CTRL

## (undated) DEVICE — INTENDED FOR TISSUE SEPARATION, AND OTHER PROCEDURES THAT REQUIRE A SHARP SURGICAL BLADE TO PUNCTURE OR CUT.: Brand: BARD-PARKER ® CARBON RIB-BACK BLADES

## (undated) DEVICE — STAPLE INT WHT BLU G GRN BLK REINF FOR ENDOPATH ECHELON FLX

## (undated) DEVICE — TRUE CONTENT TO BE POPULATED AS PART OF REBRANDING: Brand: ARGYLE

## (undated) DEVICE — BLADELESS OPTICAL TROCAR WITH FIXATION CANNULA: Brand: VERSAPORT

## (undated) DEVICE — RELOAD STPL L60MM H1-2.6MM MESENTERY THN TISS WHT 6 ROW

## (undated) DEVICE — UNIVERSAL FIXATION CANNULA: Brand: VERSAONE

## (undated) DEVICE — KIT COLON W/ 1.1OZ LUB AND 2 END

## (undated) DEVICE — RELOAD STPL H4.1X2MM DIA60MM THCK TISS GRN 6 ROW PWR GST B

## (undated) DEVICE — SOL IRR STRL H2O 1000ML BTL --

## (undated) DEVICE — KENDALL SCD EXPRESS SLEEVES, KNEE LENGTH, MEDIUM: Brand: KENDALL SCD

## (undated) DEVICE — Device: Brand: DEFENDO VALVE AND CONNECTOR KIT

## (undated) DEVICE — REM POLYHESIVE ADULT PATIENT RETURN ELECTRODE: Brand: VALLEYLAB

## (undated) DEVICE — SINGLE-USE BIOPSY FORCEPS: Brand: RADIAL JAW 4

## (undated) DEVICE — SUTURE VCRL SZ 3-0 L27IN ABSRB VLT L26MM SH 1/2 CIR J316H

## (undated) DEVICE — SUT SLK 2-0SH 30IN BLK --

## (undated) DEVICE — RELOAD STPL L60MM H1.5-3.6MM REG TISS BLU GRIPPING SURF B

## (undated) DEVICE — GOWN,AURORA,FABRIC-REINFORCED,X-LARGE: Brand: MEDLINE

## (undated) DEVICE — SUTURE MCRYL SZ 4-0 L27IN ABSRB UD L24MM PS-1 3/8 CIR PRIM Y935H

## (undated) DEVICE — DISPOSABLE SUCTION/IRRIGATOR TUBE SET WITH TIP: Brand: AHTO

## (undated) DEVICE — VISUALIZATION SYSTEM: Brand: CLEARIFY